# Patient Record
Sex: FEMALE | Race: WHITE | NOT HISPANIC OR LATINO | Employment: FULL TIME | URBAN - METROPOLITAN AREA
[De-identification: names, ages, dates, MRNs, and addresses within clinical notes are randomized per-mention and may not be internally consistent; named-entity substitution may affect disease eponyms.]

---

## 2020-10-19 PROCEDURE — U0003 INFECTIOUS AGENT DETECTION BY NUCLEIC ACID (DNA OR RNA); SEVERE ACUTE RESPIRATORY SYNDROME CORONAVIRUS 2 (SARS-COV-2) (CORONAVIRUS DISEASE [COVID-19]), AMPLIFIED PROBE TECHNIQUE, MAKING USE OF HIGH THROUGHPUT TECHNOLOGIES AS DESCRIBED BY CMS-2020-01-R: HCPCS | Performed by: NURSE PRACTITIONER

## 2020-10-22 ENCOUNTER — TELEPHONE (OUTPATIENT)
Dept: URGENT CARE | Facility: CLINIC | Age: 30
End: 2020-10-22

## 2021-09-07 ENCOUNTER — OFFICE VISIT (OUTPATIENT)
Dept: FAMILY MEDICINE CLINIC | Facility: CLINIC | Age: 31
End: 2021-09-07

## 2021-09-07 ENCOUNTER — LAB (OUTPATIENT)
Dept: FAMILY MEDICINE CLINIC | Facility: CLINIC | Age: 31
End: 2021-09-07

## 2021-09-07 VITALS
BODY MASS INDEX: 44.41 KG/M2 | HEIGHT: 68 IN | WEIGHT: 293 LBS | OXYGEN SATURATION: 100 % | SYSTOLIC BLOOD PRESSURE: 145 MMHG | HEART RATE: 63 BPM | DIASTOLIC BLOOD PRESSURE: 87 MMHG | TEMPERATURE: 98 F

## 2021-09-07 DIAGNOSIS — F41.9 ANXIETY: Primary | ICD-10-CM

## 2021-09-07 DIAGNOSIS — L40.9 PSORIASIS: ICD-10-CM

## 2021-09-07 DIAGNOSIS — R03.0 ELEVATED BP WITHOUT DIAGNOSIS OF HYPERTENSION: ICD-10-CM

## 2021-09-07 DIAGNOSIS — Z00.00 PREVENTATIVE HEALTH CARE: ICD-10-CM

## 2021-09-07 DIAGNOSIS — M25.511 ACUTE PAIN OF RIGHT SHOULDER: ICD-10-CM

## 2021-09-07 PROBLEM — Z80.1 FAMILY HISTORY OF LUNG CANCER: Status: ACTIVE | Noted: 2021-09-07

## 2021-09-07 PROBLEM — Z82.3 FAMILY HISTORY OF CEREBROVASCULAR ACCIDENT (CVA): Status: ACTIVE | Noted: 2021-09-07

## 2021-09-07 LAB
ALBUMIN SERPL-MCNC: 4.3 G/DL (ref 3.5–5.2)
ALBUMIN/GLOB SERPL: 1.4 G/DL
ALP SERPL-CCNC: 72 U/L (ref 39–117)
ALT SERPL W P-5'-P-CCNC: 16 U/L (ref 1–33)
ANION GAP SERPL CALCULATED.3IONS-SCNC: 9.7 MMOL/L (ref 5–15)
AST SERPL-CCNC: 13 U/L (ref 1–32)
BILIRUB SERPL-MCNC: 0.3 MG/DL (ref 0–1.2)
BUN SERPL-MCNC: 8 MG/DL (ref 6–20)
BUN/CREAT SERPL: 9.1 (ref 7–25)
CALCIUM SPEC-SCNC: 9 MG/DL (ref 8.6–10.5)
CHLORIDE SERPL-SCNC: 103 MMOL/L (ref 98–107)
CHOLEST SERPL-MCNC: 163 MG/DL (ref 0–200)
CO2 SERPL-SCNC: 26.3 MMOL/L (ref 22–29)
CREAT SERPL-MCNC: 0.88 MG/DL (ref 0.57–1)
GFR SERPL CREATININE-BSD FRML MDRD: 74 ML/MIN/1.73
GLOBULIN UR ELPH-MCNC: 3 GM/DL
GLUCOSE SERPL-MCNC: 95 MG/DL (ref 65–99)
HCV AB SER DONR QL: NORMAL
HDLC SERPL-MCNC: 54 MG/DL (ref 40–60)
LDLC SERPL CALC-MCNC: 87 MG/DL (ref 0–100)
LDLC/HDLC SERPL: 1.56 {RATIO}
POTASSIUM SERPL-SCNC: 4.3 MMOL/L (ref 3.5–5.2)
PROT SERPL-MCNC: 7.3 G/DL (ref 6–8.5)
SODIUM SERPL-SCNC: 139 MMOL/L (ref 136–145)
TRIGL SERPL-MCNC: 125 MG/DL (ref 0–150)
TSH SERPL DL<=0.05 MIU/L-ACNC: 1.92 UIU/ML (ref 0.27–4.2)
VLDLC SERPL-MCNC: 22 MG/DL (ref 5–40)

## 2021-09-07 PROCEDURE — 84443 ASSAY THYROID STIM HORMONE: CPT | Performed by: NURSE PRACTITIONER

## 2021-09-07 PROCEDURE — 80053 COMPREHEN METABOLIC PANEL: CPT | Performed by: NURSE PRACTITIONER

## 2021-09-07 PROCEDURE — 86803 HEPATITIS C AB TEST: CPT | Performed by: NURSE PRACTITIONER

## 2021-09-07 PROCEDURE — 99204 OFFICE O/P NEW MOD 45 MIN: CPT | Performed by: NURSE PRACTITIONER

## 2021-09-07 PROCEDURE — 36415 COLL VENOUS BLD VENIPUNCTURE: CPT

## 2021-09-07 PROCEDURE — 80061 LIPID PANEL: CPT | Performed by: NURSE PRACTITIONER

## 2021-09-07 RX ORDER — ESCITALOPRAM OXALATE 10 MG/1
10 TABLET ORAL DAILY
Qty: 30 TABLET | Refills: 0 | Status: SHIPPED | OUTPATIENT
Start: 2021-09-07 | End: 2021-10-06

## 2021-09-07 RX ORDER — TRIAMCINOLONE ACETONIDE 1 MG/G
CREAM TOPICAL
Qty: 80 G | Refills: 3 | Status: SHIPPED | OUTPATIENT
Start: 2021-09-07 | End: 2021-12-11 | Stop reason: SDUPTHER

## 2021-09-07 NOTE — PROGRESS NOTES
Subjective     Alicia Ferrer is a 31 y.o. female.     Alicia Ferrer is here today to establish care.  She is from this area  Previous PCP was Khloe Abreu  Marital status-   Children- Yes- 2 years old son  Works as  at the [employer]  Exercise- was working out 2 days a week but none recently  Diet- Eating well-balanced meals and healthy snacks with no concerns  Pt had covid 19 in Oct 2020.  She gained 50lbs when she was pregnant and never lost the weight.  She has been discouraged with weight loss.    Anxiety/depression- she states she gets upset and anxious in certain situations. She has certain triggers- chewing gum, hearing eating. She states she struggles more with the anxiety than the depression. She struggles with body image issues. She has never been on meds in the past. Denies any SI or HI.    Psoriasis- has had long term. She used to see dermatology, but hasnt in quite some time.  It is primarily on her scalp. Has used triamcinolone cream which helps. She now has a rash on her abdomen.     Right shoulder pain- slipped in the shower and caught herself with her right arm in July. She states that she continues to have some pain in the right shoulder. Pain comes and goes with certain movements. When she is driving she can feel a pull in the shoulder. She took tylenol and ibuprofen in the beginning. Rates the pain 6/10.    Labs- due  Pap smear- 12/14/18- normal- will start getting here    Vaccines:  Flu- N/A  PNA- N/A  Tdap- 2019  Covid-19- UTD    Dental exam- UTD  Eye exam- UTD           The following portions of the patient's history were reviewed and updated as appropriate: allergies, current medications, past family history, past medical history, past social history, past surgical history and problem list.    Review of Systems   Constitutional: Negative for appetite change, chills, fatigue and fever.   HENT: Negative for congestion, ear pain, hearing loss, postnasal drip,  "rhinorrhea, sinus pressure, sore throat, swollen glands and trouble swallowing.    Eyes: Negative for blurred vision, double vision, pain, discharge, itching and visual disturbance.   Respiratory: Negative for cough, chest tightness, shortness of breath and wheezing.    Cardiovascular: Negative for chest pain and palpitations.   Gastrointestinal: Negative for abdominal pain, blood in stool, constipation, diarrhea, nausea and vomiting.   Endocrine: Negative for polydipsia, polyphagia and polyuria.   Genitourinary: Negative for dysuria, flank pain, frequency and urgency.   Musculoskeletal: Positive for arthralgias (right shoulder). Negative for back pain and myalgias.   Skin: Positive for rash (psoriasis on scalp, abdomen, and right elbow). Negative for skin lesions.   Neurological: Positive for headache. Negative for dizziness, weakness and numbness.   Psychiatric/Behavioral: Negative for self-injury, suicidal ideas, depressed mood and stress. The patient is nervous/anxious.        Objective     /87   Pulse 63   Temp 98 °F (36.7 °C) (Tympanic)   Ht 171.5 cm (67.5\")   Wt 136 kg (300 lb)   SpO2 100%   BMI 46.29 kg/m²     Current Outpatient Medications on File Prior to Visit   Medication Sig Dispense Refill   • [DISCONTINUED] triamcinolone (KENALOG) 0.1 % cream Apply to the affected area(s) three times daily 80 g 3     No current facility-administered medications on file prior to visit.        Physical Exam  Vitals reviewed.   Constitutional:       General: She is not in acute distress.     Appearance: Normal appearance. She is well-developed. She is not diaphoretic.   HENT:      Head: Normocephalic and atraumatic.   Eyes:      General:         Right eye: No discharge.         Left eye: No discharge.      Conjunctiva/sclera: Conjunctivae normal.      Pupils: Pupils are equal, round, and reactive to light.   Cardiovascular:      Rate and Rhythm: Normal rate and regular rhythm.      Heart sounds: No murmur " heard.     Pulmonary:      Effort: Pulmonary effort is normal. No respiratory distress.      Breath sounds: Normal breath sounds. No wheezing or rales.   Abdominal:      General: Bowel sounds are normal. There is no distension.      Palpations: Abdomen is soft.      Tenderness: There is no abdominal tenderness.   Musculoskeletal:         General: Normal range of motion.      Cervical back: Normal range of motion and neck supple.      Comments: Normal ROM right shoulder   Skin:     General: Skin is warm and dry.   Neurological:      Mental Status: She is alert and oriented to person, place, and time.   Psychiatric:         Mood and Affect: Mood normal.         Behavior: Behavior normal.         Thought Content: Thought content normal.         Judgment: Judgment normal.           Assessment/Plan     Diagnoses and all orders for this visit:    1. Anxiety (Primary)  Comments:  new issue  start lexapro  f/u 1 mo  denies SI or HI  Orders:  -     escitalopram (Lexapro) 10 MG tablet; Take 1 tablet by mouth Daily.  Dispense: 30 tablet; Refill: 0    2. Preventative health care  Comments:  work on diet and exercise  check labs  schedule pap in Dec  Orders:  -     Lipid panel; Future  -     Comprehensive metabolic panel; Future  -     Hepatitis C Antibody; Future    3. Body mass index (BMI) of 45.0 to 49.9 in adult (CMS/HCC)  Comments:  work on diet and exercise  exercise 4-5 days a week  Orders:  -     TSH; Future    4. Psoriasis  Comments:  stable  doesnt want to see derm yet  cont kenalog  Orders:  -     triamcinolone (KENALOG) 0.1 % cream; Apply to the affected area(s) three times daily  Dispense: 80 g; Refill: 3    5. Acute pain of right shoulder  Comments:  possibly rotator cuff strain  full ROM  try ibuprofen  PT ordered  Orders:  -     Ambulatory Referral to Physical Therapy Evaluate and treat    6. Elevated BP without diagnosis of hypertension  Comments:  work on diet and exercise  may need to start BP med  f/u 1  mo

## 2021-09-07 NOTE — PATIENT INSTRUCTIONS
Start lexapro  Work on diet and exercise  Check labs  Take ibuprofen for shoulder pain  PT ordered  Call for issues or concerns

## 2021-10-06 ENCOUNTER — OFFICE VISIT (OUTPATIENT)
Dept: FAMILY MEDICINE CLINIC | Facility: CLINIC | Age: 31
End: 2021-10-06

## 2021-10-06 VITALS
OXYGEN SATURATION: 98 % | DIASTOLIC BLOOD PRESSURE: 87 MMHG | HEART RATE: 61 BPM | WEIGHT: 293 LBS | SYSTOLIC BLOOD PRESSURE: 140 MMHG | BODY MASS INDEX: 46.29 KG/M2 | TEMPERATURE: 98 F

## 2021-10-06 DIAGNOSIS — F41.9 ANXIETY: Primary | ICD-10-CM

## 2021-10-06 PROCEDURE — 99213 OFFICE O/P EST LOW 20 MIN: CPT | Performed by: NURSE PRACTITIONER

## 2021-10-06 RX ORDER — ESCITALOPRAM OXALATE 20 MG/1
20 TABLET ORAL DAILY
Qty: 30 TABLET | Refills: 2 | Status: SHIPPED | OUTPATIENT
Start: 2021-10-06 | End: 2022-01-03 | Stop reason: SDUPTHER

## 2021-10-06 NOTE — PATIENT INSTRUCTIONS
Increase lexapro to 20mg daily  \message update in 1 mo via [employer]  Call for issues or concerns

## 2021-10-06 NOTE — PROGRESS NOTES
Subjective     Alicia Ferrer is a 31 y.o. female.     PT is here today for a 1 mo follow up on anxiety.  Last month she was started on lexapro 10mg daily.  She states that she can tell a difference since starting the medication but she is still feeling some anxiety and pulling at her chest.  She states that her coworkers have noticed an improvement  Doesn't have issues with depression.  Denies SI or HI   Would like to try increasing the medication.        The following portions of the patient's history were reviewed and updated as appropriate: allergies, current medications, past family history, past medical history, past social history, past surgical history and problem list.    Review of Systems   Constitutional: Negative for chills, fatigue and fever.   Respiratory: Positive for chest tightness. Negative for shortness of breath.    Cardiovascular: Negative for chest pain and palpitations.   Neurological: Negative for dizziness and headache.   Psychiatric/Behavioral: Negative for depressed mood and stress. The patient is nervous/anxious.        Objective     /87 (BP Location: Right arm, Patient Position: Sitting, Cuff Size: Large Adult)   Pulse 61   Temp 98 °F (36.7 °C) (Tympanic)   Wt 136 kg (300 lb)   SpO2 98%   BMI 46.29 kg/m²     Current Outpatient Medications on File Prior to Visit   Medication Sig Dispense Refill   • triamcinolone (KENALOG) 0.1 % cream Apply to the affected area(s) three times daily 80 g 3   • [DISCONTINUED] escitalopram (Lexapro) 10 MG tablet Take 1 tablet by mouth Daily. 30 tablet 0     No current facility-administered medications on file prior to visit.        Physical Exam  Constitutional:       Appearance: Normal appearance. She is not ill-appearing.   HENT:      Head: Normocephalic.   Pulmonary:      Effort: Pulmonary effort is normal. No respiratory distress.      Breath sounds: Normal breath sounds.   Musculoskeletal:         General: Normal range of motion.   Skin:      General: Skin is warm and dry.   Neurological:      General: No focal deficit present.      Mental Status: She is alert and oriented to person, place, and time.   Psychiatric:         Mood and Affect: Mood normal.         Behavior: Behavior normal.         Thought Content: Thought content normal.         Judgment: Judgment normal.           Assessment/Plan     Diagnoses and all orders for this visit:    1. Anxiety (Primary)  Comments:  improved  will try to increase lexapro to 20mg daily  call with update in1 mo  f/u 3 mo  Orders:  -     escitalopram (Lexapro) 20 MG tablet; Take 1 tablet by mouth Daily.  Dispense: 30 tablet; Refill: 2

## 2021-12-11 DIAGNOSIS — L40.9 PSORIASIS: ICD-10-CM

## 2021-12-13 RX ORDER — TRIAMCINOLONE ACETONIDE 1 MG/G
CREAM TOPICAL
Qty: 80 G | Refills: 3 | Status: SHIPPED | OUTPATIENT
Start: 2021-12-13 | End: 2022-01-13 | Stop reason: SDUPTHER

## 2022-01-03 DIAGNOSIS — F41.9 ANXIETY: ICD-10-CM

## 2022-01-03 RX ORDER — ESCITALOPRAM OXALATE 20 MG/1
20 TABLET ORAL DAILY
Qty: 30 TABLET | Refills: 2 | Status: SHIPPED | OUTPATIENT
Start: 2022-01-03 | End: 2022-02-09 | Stop reason: SDUPTHER

## 2022-01-13 DIAGNOSIS — L40.9 PSORIASIS: ICD-10-CM

## 2022-01-13 RX ORDER — TRIAMCINOLONE ACETONIDE 1 MG/G
CREAM TOPICAL
Qty: 80 G | Refills: 3 | Status: SHIPPED | OUTPATIENT
Start: 2022-01-13 | End: 2022-02-13 | Stop reason: SDUPTHER

## 2022-02-09 ENCOUNTER — OFFICE VISIT (OUTPATIENT)
Dept: FAMILY MEDICINE CLINIC | Facility: CLINIC | Age: 32
End: 2022-02-09

## 2022-02-09 VITALS
TEMPERATURE: 97.7 F | SYSTOLIC BLOOD PRESSURE: 133 MMHG | OXYGEN SATURATION: 97 % | DIASTOLIC BLOOD PRESSURE: 83 MMHG | BODY MASS INDEX: 45.52 KG/M2 | HEART RATE: 78 BPM | WEIGHT: 293 LBS

## 2022-02-09 DIAGNOSIS — Z12.4 PAP SMEAR FOR CERVICAL CANCER SCREENING: ICD-10-CM

## 2022-02-09 DIAGNOSIS — F41.9 ANXIETY: Primary | ICD-10-CM

## 2022-02-09 PROCEDURE — 99213 OFFICE O/P EST LOW 20 MIN: CPT | Performed by: NURSE PRACTITIONER

## 2022-02-09 RX ORDER — ESCITALOPRAM OXALATE 20 MG/1
20 TABLET ORAL DAILY
Qty: 90 TABLET | Refills: 1 | Status: SHIPPED | OUTPATIENT
Start: 2022-02-09 | End: 2022-10-11 | Stop reason: SDUPTHER

## 2022-02-09 NOTE — PROGRESS NOTES
Subjective     Alicia Ferrer is a 31 y.o. female.     Patient is here today to follow-up on anxiety and to have her Pap smear completed.  Patient's Lexapro was increased to 20 mg at her last visit.  Patient reports that the increased dose in working much better.  She states that she did notice some increased fatigue and a headache when she first started the increased dose but that has resolved.  She states that there are still times that she feels tightening in her chest d/t anxiety but that it is occurring a lot less frequent.   Pt reports that she is satisfied with the dosage at this time.    Pt reports normal menstrual cycles. They typically last 4-6 days. Her last cycle was She does report some dysmenorrhea. Her last cycle was 2/1/22. She denies any vaginal pain, abnormal bleeding, or discharge.        The following portions of the patient's history were reviewed and updated as appropriate: allergies, current medications, past family history, past medical history, past social history, past surgical history and problem list.    Review of Systems   Constitutional: Negative for chills, fatigue and fever.   Respiratory: Positive for chest tightness (on occasion with anxiety). Negative for shortness of breath.    Cardiovascular: Negative for chest pain and palpitations.   Genitourinary: Negative for difficulty urinating, dysuria, menstrual problem, pelvic pain, vaginal bleeding, vaginal discharge and vaginal pain.   Psychiatric/Behavioral: Negative for self-injury and suicidal ideas. The patient is not nervous/anxious.        Objective     /83   Pulse 78   Temp 97.7 °F (36.5 °C) (Tympanic)   Wt 134 kg (295 lb)   SpO2 97%   BMI 45.52 kg/m²     Current Outpatient Medications on File Prior to Visit   Medication Sig Dispense Refill   • escitalopram (Lexapro) 20 MG tablet Take 1 tablet by mouth Daily. 30 tablet 2   • triamcinolone (KENALOG) 0.1 % cream Apply to the affected area(s) three times daily as  directed. 80 g 3     No current facility-administered medications on file prior to visit.        Physical Exam  Exam conducted with a chaperone present.   Constitutional:       General: She is not in acute distress.     Appearance: She is obese. She is not ill-appearing.   HENT:      Head: Normocephalic and atraumatic.   Cardiovascular:      Rate and Rhythm: Normal rate and regular rhythm.      Pulses: Normal pulses.      Heart sounds: No murmur heard.      Pulmonary:      Effort: Pulmonary effort is normal. No respiratory distress.      Breath sounds: Normal breath sounds. No wheezing.   Genitourinary:     Exam position: Supine.      Labia:         Right: No rash or tenderness.         Left: No rash or tenderness.       Vagina: Normal.      Cervix: Normal.      Uterus: Not enlarged and not tender.       Adnexa:         Right: No mass or tenderness.          Left: No mass or tenderness.     Musculoskeletal:         General: Normal range of motion.   Skin:     General: Skin is warm and dry.   Neurological:      General: No focal deficit present.      Mental Status: She is alert and oriented to person, place, and time.   Psychiatric:         Mood and Affect: Mood normal.         Behavior: Behavior normal.         Thought Content: Thought content normal.         Judgment: Judgment normal.           Assessment/Plan     Diagnoses and all orders for this visit:    1. Anxiety (Primary)  Comments:  Improving  Cont 20mg Lexapro  call for issues/concerns  f/u in 6mos    2. Pap smear for cervical cancer screening  Comments:  pap and pelvic completed  will notify of results

## 2022-02-12 LAB
AGE GDLN ACOG TESTING: NORMAL
CYTOLOGIST CVX/VAG CYTO: NORMAL
CYTOLOGY CVX/VAG DOC CYTO: NORMAL
CYTOLOGY CVX/VAG DOC THIN PREP: NORMAL
DX ICD CODE: NORMAL
HIV 1 & 2 AB SER-IMP: NORMAL
HPV I/H RISK 4 DNA CVX QL PROBE+SIG AMP: NEGATIVE
OTHER STN SPEC: NORMAL
STAT OF ADQ CVX/VAG CYTO-IMP: NORMAL

## 2022-02-13 DIAGNOSIS — L40.9 PSORIASIS: ICD-10-CM

## 2022-02-13 RX ORDER — TRIAMCINOLONE ACETONIDE 1 MG/G
CREAM TOPICAL
Qty: 80 G | Refills: 3 | Status: SHIPPED | OUTPATIENT
Start: 2022-02-13 | End: 2022-06-06 | Stop reason: SDUPTHER

## 2022-02-13 RX ORDER — TRIAMCINOLONE ACETONIDE 1 MG/G
CREAM TOPICAL
Qty: 80 G | Refills: 3 | Status: CANCELLED | OUTPATIENT
Start: 2022-02-13

## 2022-05-31 ENCOUNTER — TELEPHONE (OUTPATIENT)
Dept: FAMILY MEDICINE CLINIC | Facility: CLINIC | Age: 32
End: 2022-05-31

## 2022-05-31 RX ORDER — AZITHROMYCIN 250 MG/1
TABLET, FILM COATED ORAL
Qty: 6 TABLET | Refills: 0 | Status: SHIPPED | OUTPATIENT
Start: 2022-05-31 | End: 2022-08-10

## 2022-05-31 NOTE — TELEPHONE ENCOUNTER
----- Message from Carlene Dodson CMA sent at 2022 11:34 AM EDT -----  Regarding: FW: Strep throat     ----- Message -----  From: Alicia Ferrer  Sent: 2022  11:31 AM EDT  To: [facility]  Subject: Strep throat                                     If not then I can just try and go to the urgent care after my nephew's  this afternoon. Please just let me know what I should do because my throat is on fire

## 2022-06-06 DIAGNOSIS — L40.9 PSORIASIS: ICD-10-CM

## 2022-06-06 RX ORDER — TRIAMCINOLONE ACETONIDE 1 MG/G
CREAM TOPICAL
Qty: 80 G | Refills: 3 | Status: SHIPPED | OUTPATIENT
Start: 2022-06-06 | End: 2022-07-30 | Stop reason: SDUPTHER

## 2022-07-19 RX ORDER — FLUCONAZOLE 150 MG/1
150 TABLET ORAL ONCE
Qty: 1 TABLET | Refills: 0 | Status: SHIPPED | OUTPATIENT
Start: 2022-07-19 | End: 2022-07-20

## 2022-07-30 DIAGNOSIS — L40.9 PSORIASIS: ICD-10-CM

## 2022-07-30 RX ORDER — TRIAMCINOLONE ACETONIDE 1 MG/G
CREAM TOPICAL
Qty: 80 G | Refills: 3 | Status: SHIPPED | OUTPATIENT
Start: 2022-07-30 | End: 2022-09-23 | Stop reason: SDUPTHER

## 2022-08-10 ENCOUNTER — OFFICE VISIT (OUTPATIENT)
Dept: FAMILY MEDICINE CLINIC | Facility: CLINIC | Age: 32
End: 2022-08-10

## 2022-08-10 VITALS
HEART RATE: 63 BPM | DIASTOLIC BLOOD PRESSURE: 84 MMHG | OXYGEN SATURATION: 98 % | SYSTOLIC BLOOD PRESSURE: 142 MMHG | WEIGHT: 293 LBS | BODY MASS INDEX: 45.83 KG/M2 | TEMPERATURE: 98.2 F

## 2022-08-10 DIAGNOSIS — F41.9 ANXIETY: Primary | ICD-10-CM

## 2022-08-10 DIAGNOSIS — Z00.00 PREVENTATIVE HEALTH CARE: ICD-10-CM

## 2022-08-10 DIAGNOSIS — L40.9 PSORIASIS: ICD-10-CM

## 2022-08-10 PROCEDURE — 99213 OFFICE O/P EST LOW 20 MIN: CPT | Performed by: NURSE PRACTITIONER

## 2022-08-10 NOTE — PROGRESS NOTES
Answers for HPI/ROS submitted by the patient on 8/3/2022  Please describe your symptoms.: 6 mo f/u  Have you had these symptoms before?: Yes  How long have you been having these symptoms?: 1-4 days  Please list any medications you are currently taking for this condition.: Lexapro 20 mg, Cream for psoriasis  What is the primary reason for your visit?: Other    Subjective     Alicia Ferrer is a 32 y.o. female.     Patient is here today for 6-month follow-up on anxiety and psoriasis.  BP continues to be mildly elevated.  She will monitor it at work.   Denies any issues or concerns today     Anxiety-patient is currently on Lexapro 20 mg daily. Doing well on medication. Denies any SI or HI. No side effects.    Psoriasis-patient currently has Kenalog 0.1% cream as needed.  She does not get flares often.  When she does they are mostly on her scalp. She will get one on her elbow as well.     Labs- due  Pap smear- UTD 2/9/22     Vaccines:  Flu- N/A  PNA- N/A  Tdap- 2019  Covid-19- UTD     Dental exam- UTD  Eye exam- UTD       The following portions of the patient's history were reviewed and updated as appropriate: allergies, current medications, past family history, past medical history, past social history, past surgical history and problem list.    Review of Systems   Constitutional: Negative for chills, fatigue and fever.   Respiratory: Negative for chest tightness and shortness of breath.    Cardiovascular: Negative for chest pain and palpitations.   Skin: Positive for skin lesions (psoriasis).   Neurological: Negative for dizziness and headache.   Psychiatric/Behavioral: Negative for self-injury, suicidal ideas, depressed mood and stress. The patient is not nervous/anxious.        Objective     /84 (BP Location: Left arm, Patient Position: Sitting, Cuff Size: Large Adult)   Pulse 63   Temp 98.2 °F (36.8 °C) (Tympanic)   Wt 135 kg (297 lb)   SpO2 98%   BMI 45.83 kg/m²     Current Outpatient Medications on  File Prior to Visit   Medication Sig Dispense Refill   • escitalopram (Lexapro) 20 MG tablet Take 1 tablet by mouth Daily. 90 tablet 1   • triamcinolone (KENALOG) 0.1 % cream Apply topically to the affected area(s) three times daily as directed. 80 g 3   • [DISCONTINUED] azithromycin (Zithromax) 250 MG tablet Take 2 tablets the first day, then 1 tablet daily for 4 days. 6 tablet 0     No current facility-administered medications on file prior to visit.        Physical Exam  Constitutional:       General: She is not in acute distress.     Appearance: Normal appearance. She is obese. She is not ill-appearing.   HENT:      Head: Normocephalic and atraumatic.   Cardiovascular:      Rate and Rhythm: Normal rate and regular rhythm.      Heart sounds: No murmur heard.  Pulmonary:      Effort: Pulmonary effort is normal. No respiratory distress.      Breath sounds: Normal breath sounds.   Musculoskeletal:         General: Normal range of motion.   Skin:     General: Skin is warm and dry.      Findings: Lesion (psoriasis dmitry arm) present.   Neurological:      General: No focal deficit present.      Mental Status: She is alert and oriented to person, place, and time.   Psychiatric:         Mood and Affect: Mood normal.         Behavior: Behavior normal.         Thought Content: Thought content normal.         Judgment: Judgment normal.           Assessment & Plan     Diagnoses and all orders for this visit:    1. Anxiety (Primary)  Comments:  stable  cont lexapro  denies SI or HI  f/u 1 yr    2. Psoriasis  Comments:  stable  cont steroid cream    3. Preventative health care  Comments:  check labs  work on diet and exercise  Orders:  -     Comprehensive Metabolic Panel; Future  -     Lipid Panel; Future

## 2022-09-14 ENCOUNTER — LAB (OUTPATIENT)
Dept: LAB | Facility: HOSPITAL | Age: 32
End: 2022-09-14

## 2022-09-14 DIAGNOSIS — Z00.00 PREVENTATIVE HEALTH CARE: ICD-10-CM

## 2022-09-14 LAB
ALBUMIN SERPL-MCNC: 4.4 G/DL (ref 3.5–5.2)
ALBUMIN/GLOB SERPL: 1.6 G/DL
ALP SERPL-CCNC: 61 U/L (ref 39–117)
ALT SERPL W P-5'-P-CCNC: 21 U/L (ref 1–33)
ANION GAP SERPL CALCULATED.3IONS-SCNC: 9.7 MMOL/L (ref 5–15)
AST SERPL-CCNC: 19 U/L (ref 1–32)
BILIRUB SERPL-MCNC: 0.5 MG/DL (ref 0–1.2)
BUN SERPL-MCNC: 10 MG/DL (ref 6–20)
BUN/CREAT SERPL: 12.5 (ref 7–25)
CALCIUM SPEC-SCNC: 9.7 MG/DL (ref 8.6–10.5)
CHLORIDE SERPL-SCNC: 103 MMOL/L (ref 98–107)
CHOLEST SERPL-MCNC: 179 MG/DL (ref 0–200)
CO2 SERPL-SCNC: 25.3 MMOL/L (ref 22–29)
CREAT SERPL-MCNC: 0.8 MG/DL (ref 0.57–1)
EGFRCR SERPLBLD CKD-EPI 2021: 99.3 ML/MIN/1.73
GLOBULIN UR ELPH-MCNC: 2.7 GM/DL
GLUCOSE SERPL-MCNC: 89 MG/DL (ref 65–99)
HDLC SERPL-MCNC: 62 MG/DL (ref 40–60)
LDLC SERPL CALC-MCNC: 91 MG/DL (ref 0–100)
LDLC/HDLC SERPL: 1.41 {RATIO}
POTASSIUM SERPL-SCNC: 4.2 MMOL/L (ref 3.5–5.2)
PROT SERPL-MCNC: 7.1 G/DL (ref 6–8.5)
SODIUM SERPL-SCNC: 138 MMOL/L (ref 136–145)
TRIGL SERPL-MCNC: 149 MG/DL (ref 0–150)
VLDLC SERPL-MCNC: 26 MG/DL (ref 5–40)

## 2022-09-14 PROCEDURE — 80053 COMPREHEN METABOLIC PANEL: CPT

## 2022-09-14 PROCEDURE — 80061 LIPID PANEL: CPT

## 2022-09-14 PROCEDURE — 36415 COLL VENOUS BLD VENIPUNCTURE: CPT

## 2022-09-16 RX ORDER — BUSPIRONE HYDROCHLORIDE 5 MG/1
5 TABLET ORAL 2 TIMES DAILY PRN
Qty: 60 TABLET | Refills: 0 | Status: SHIPPED | OUTPATIENT
Start: 2022-09-16 | End: 2022-10-11 | Stop reason: SDUPTHER

## 2022-09-23 DIAGNOSIS — L40.9 PSORIASIS: ICD-10-CM

## 2022-09-23 RX ORDER — TRIAMCINOLONE ACETONIDE 1 MG/G
CREAM TOPICAL
Qty: 80 G | Refills: 3 | Status: SHIPPED | OUTPATIENT
Start: 2022-09-23 | End: 2023-01-16 | Stop reason: SDUPTHER

## 2022-10-11 DIAGNOSIS — F41.9 ANXIETY: ICD-10-CM

## 2022-10-11 RX ORDER — BUSPIRONE HYDROCHLORIDE 5 MG/1
5 TABLET ORAL 2 TIMES DAILY PRN
Qty: 60 TABLET | Refills: 0 | Status: SHIPPED | OUTPATIENT
Start: 2022-10-11 | End: 2022-12-16 | Stop reason: SDUPTHER

## 2022-10-11 RX ORDER — ESCITALOPRAM OXALATE 20 MG/1
20 TABLET ORAL DAILY
Qty: 90 TABLET | Refills: 1 | Status: SHIPPED | OUTPATIENT
Start: 2022-10-11 | End: 2023-03-01 | Stop reason: SDUPTHER

## 2022-12-16 RX ORDER — BUSPIRONE HYDROCHLORIDE 5 MG/1
5 TABLET ORAL 2 TIMES DAILY PRN
Qty: 60 TABLET | Refills: 0 | Status: SHIPPED | OUTPATIENT
Start: 2022-12-16 | End: 2023-03-01 | Stop reason: SDUPTHER

## 2023-01-16 DIAGNOSIS — L40.9 PSORIASIS: ICD-10-CM

## 2023-01-16 RX ORDER — TRIAMCINOLONE ACETONIDE 1 MG/G
CREAM TOPICAL
Qty: 80 G | Refills: 3 | Status: SHIPPED | OUTPATIENT
Start: 2023-01-16

## 2023-03-01 DIAGNOSIS — F41.9 ANXIETY: ICD-10-CM

## 2023-03-01 RX ORDER — ESCITALOPRAM OXALATE 20 MG/1
20 TABLET ORAL DAILY
Qty: 90 TABLET | Refills: 1 | Status: SHIPPED | OUTPATIENT
Start: 2023-03-01

## 2023-03-01 RX ORDER — BUSPIRONE HYDROCHLORIDE 5 MG/1
5 TABLET ORAL 2 TIMES DAILY PRN
Qty: 60 TABLET | Refills: 0 | Status: SHIPPED | OUTPATIENT
Start: 2023-03-01

## 2023-05-05 DIAGNOSIS — L40.9 PSORIASIS: ICD-10-CM

## 2023-05-05 RX ORDER — TRIAMCINOLONE ACETONIDE 1 MG/G
CREAM TOPICAL
Qty: 80 G | Refills: 3 | Status: SHIPPED | OUTPATIENT
Start: 2023-05-05

## 2023-05-21 RX ORDER — BUSPIRONE HYDROCHLORIDE 5 MG/1
5 TABLET ORAL 2 TIMES DAILY PRN
Qty: 60 TABLET | Refills: 0 | Status: CANCELLED | OUTPATIENT
Start: 2023-05-21

## 2023-05-22 RX ORDER — BUSPIRONE HYDROCHLORIDE 5 MG/1
5 TABLET ORAL 2 TIMES DAILY PRN
Qty: 60 TABLET | Refills: 0 | Status: SHIPPED | OUTPATIENT
Start: 2023-05-22

## 2023-08-01 RX ORDER — BUSPIRONE HYDROCHLORIDE 5 MG/1
5 TABLET ORAL 2 TIMES DAILY PRN
Qty: 60 TABLET | Refills: 0 | Status: SHIPPED | OUTPATIENT
Start: 2023-08-01

## 2023-08-16 ENCOUNTER — OFFICE VISIT (OUTPATIENT)
Dept: FAMILY MEDICINE CLINIC | Facility: CLINIC | Age: 33
End: 2023-08-16
Payer: COMMERCIAL

## 2023-08-16 VITALS
HEART RATE: 70 BPM | TEMPERATURE: 98 F | SYSTOLIC BLOOD PRESSURE: 139 MMHG | BODY MASS INDEX: 44.41 KG/M2 | OXYGEN SATURATION: 97 % | DIASTOLIC BLOOD PRESSURE: 86 MMHG | HEIGHT: 68 IN | WEIGHT: 293 LBS

## 2023-08-16 DIAGNOSIS — F41.9 ANXIETY: ICD-10-CM

## 2023-08-16 DIAGNOSIS — Z00.00 PREVENTATIVE HEALTH CARE: Primary | ICD-10-CM

## 2023-08-16 DIAGNOSIS — L40.9 PSORIASIS: ICD-10-CM

## 2023-08-16 PROCEDURE — 99395 PREV VISIT EST AGE 18-39: CPT | Performed by: NURSE PRACTITIONER

## 2023-08-16 NOTE — PROGRESS NOTES
Subjective     Alicia Ferrer is a 33 y.o. female.     History of Present Illness  Patient is here today for her annual CPE  BP continues to be borderline  Pt is a  at the [employer].  She has not been exercising.  She tries to eat a well balanced diet.  She is having regular menstrual cycles.  She is not on birth control.  Denies issues or concerns     Anxiety-patient is currently on Lexapro 20 mg daily and buspar 5mg daily. Doing well on medication. Denies any SI or HI. No side effects.     Psoriasis-patient currently has Kenalog 0.1% cream as needed.  She does not get flares often.  When she does they are mostly on her scalp. She will get one on her elbow as well.      Labs- due  Pap smear- UTD 2/9/22     Vaccines:  Flu- N/A  PNA- N/A  Tdap- 2019  Covid-19- UTD     Dental exam- UTD  Eye exam- UTD       The following portions of the patient's history were reviewed and updated as appropriate: allergies, current medications, past family history, past medical history, past social history, past surgical history, and problem list.    Review of Systems   Constitutional:  Negative for appetite change, chills, fatigue and fever.   HENT:  Negative for congestion, ear pain, hearing loss, postnasal drip, rhinorrhea, sinus pressure, sore throat, swollen glands and trouble swallowing.    Eyes:  Negative for blurred vision, double vision, pain, discharge, itching and visual disturbance.   Respiratory:  Negative for cough, chest tightness, shortness of breath and wheezing.    Cardiovascular:  Negative for chest pain and palpitations.   Gastrointestinal:  Positive for diarrhea (occasional). Negative for abdominal pain, blood in stool, constipation, nausea and vomiting.   Endocrine: Negative for polydipsia, polyphagia and polyuria.   Genitourinary:  Negative for breast lump, breast pain, dysuria, flank pain, frequency, urgency, vaginal discharge and vaginal pain.   Musculoskeletal:  Negative for arthralgias, back  "pain and myalgias.   Skin:  Positive for rash. Negative for skin lesions. Wound: psoriasis.  Neurological:  Negative for dizziness, weakness, numbness and headache.   Psychiatric/Behavioral:  Negative for self-injury, suicidal ideas, depressed mood and stress. The patient is not nervous/anxious.      Objective     /86 (BP Location: Left arm, Patient Position: Sitting, Cuff Size: Large Adult)   Pulse 70   Temp 98 øF (36.7 øC) (Oral)   Ht 171.5 cm (67.5\")   Wt (!) 142 kg (312 lb)   SpO2 97%   BMI 48.14 kg/mý     Current Outpatient Medications on File Prior to Visit   Medication Sig Dispense Refill    busPIRone (BUSPAR) 5 MG tablet Take 1 tablet by mouth 2 (Two) Times a Day As Needed for anxiety 60 tablet 0    escitalopram (Lexapro) 20 MG tablet Take 1 tablet by mouth Daily. 90 tablet 1    triamcinolone (KENALOG) 0.1 % cream Apply topically to the affected area(s) three times daily as directed. 80 g 3     No current facility-administered medications on file prior to visit.        Class 3 Severe Obesity (BMI >=40). Obesity-related health conditions include the following: none. Obesity is unchanged. BMI is is above average; BMI management plan is completed. We discussed portion control and increasing exercise.       Physical Exam  Vitals reviewed.   Constitutional:       General: She is not in acute distress.     Appearance: Normal appearance. She is well-developed. She is obese. She is not diaphoretic.   HENT:      Head: Normocephalic and atraumatic.      Right Ear: Tympanic membrane and ear canal normal.      Left Ear: Tympanic membrane and ear canal normal.      Nose: No congestion.   Eyes:      General:         Right eye: No discharge.         Left eye: No discharge.      Extraocular Movements: Extraocular movements intact.      Conjunctiva/sclera: Conjunctivae normal.   Cardiovascular:      Rate and Rhythm: Normal rate and regular rhythm.      Heart sounds: No murmur heard.  Pulmonary:      Effort: " Pulmonary effort is normal. No respiratory distress.      Breath sounds: Normal breath sounds. No wheezing or rales.   Abdominal:      General: Bowel sounds are normal.      Palpations: Abdomen is soft.   Musculoskeletal:         General: Normal range of motion.      Cervical back: Normal range of motion.   Skin:     General: Skin is warm and dry.      Comments: Face and scalp peeling from sunburn   Neurological:      General: No focal deficit present.      Mental Status: She is alert and oriented to person, place, and time.   Psychiatric:         Mood and Affect: Mood normal.         Behavior: Behavior normal.         Thought Content: Thought content normal.         Judgment: Judgment normal.         Assessment & Plan     Diagnoses and all orders for this visit:    1. Preventative health care (Primary)  Comments:  doing well  work on diet and exercise- encouraged 150mins/wk  check labs  pap UTD  Orders:  -     Comprehensive Metabolic Panel; Future  -     Lipid Panel; Future    2. Anxiety  Comments:  stable  cont lexapro and buspar  denies SI or HI    3. Psoriasis  Comments:  stable  cont steroid cream

## 2023-08-27 DIAGNOSIS — L40.9 PSORIASIS: ICD-10-CM

## 2023-08-27 RX ORDER — TRIAMCINOLONE ACETONIDE 1 MG/G
CREAM TOPICAL
Qty: 80 G | Refills: 3 | Status: SHIPPED | OUTPATIENT
Start: 2023-08-27

## 2023-10-10 RX ORDER — BUSPIRONE HYDROCHLORIDE 5 MG/1
5 TABLET ORAL 2 TIMES DAILY PRN
Qty: 60 TABLET | Refills: 0 | Status: SHIPPED | OUTPATIENT
Start: 2023-10-10

## 2023-10-23 DIAGNOSIS — F41.9 ANXIETY: ICD-10-CM

## 2023-10-23 RX ORDER — ESCITALOPRAM OXALATE 20 MG/1
20 TABLET ORAL DAILY
Qty: 90 TABLET | Refills: 1 | Status: SHIPPED | OUTPATIENT
Start: 2023-10-23

## 2023-11-29 LAB
EXTERNAL ABO GROUPING: NORMAL
EXTERNAL HEPATITIS B SURFACE ANTIGEN: NEGATIVE
EXTERNAL HEPATITIS C AB: NORMAL
EXTERNAL RH FACTOR: POSITIVE
EXTERNAL RUBELLA QUALITATIVE: NORMAL
EXTERNAL SYPHILIS ANTIBODY: NEGATIVE
HIV1 P24 AG SERPL QL IA: NORMAL

## 2023-12-17 DIAGNOSIS — L40.9 PSORIASIS: ICD-10-CM

## 2023-12-17 RX ORDER — TRIAMCINOLONE ACETONIDE 1 MG/G
CREAM TOPICAL
Qty: 80 G | Refills: 3 | Status: CANCELLED | OUTPATIENT
Start: 2023-12-17

## 2023-12-18 RX ORDER — TRIAMCINOLONE ACETONIDE 1 MG/G
CREAM TOPICAL
Qty: 80 G | Refills: 3 | Status: SHIPPED | OUTPATIENT
Start: 2023-12-18

## 2024-04-07 DIAGNOSIS — L40.9 PSORIASIS: ICD-10-CM

## 2024-04-07 RX ORDER — TRIAMCINOLONE ACETONIDE 1 MG/G
CREAM TOPICAL
Qty: 80 G | Refills: 3 | Status: SHIPPED | OUTPATIENT
Start: 2024-04-07

## 2024-04-10 ENCOUNTER — PHARMACY IMMUNIZATION REVIEW (OUTPATIENT)
Dept: PHARMACY | Facility: HOSPITAL | Age: 34
End: 2024-04-10
Payer: COMMERCIAL

## 2024-04-10 NOTE — PROGRESS NOTES
[facility] Vaccination Administration    Patient reported to [facility] via referral from [facility] on 4/10/24.     Allergies:    Patient has no known allergies.    Vaccine History:   Immunization History   Administered Date(s) Administered    COVID-19 (PFIZER) Purple Cap Monovalent 03/16/2021, 04/06/2021, 12/13/2021    Hepatitis A 04/25/2018    Influenza, Unspecified 10/09/2020    Tdap 06/07/2019       Plan:    The following vaccines were administered today:  Tdap    Lela Carvajal, Pharmacy Technician  4/10/2024  11:59 EDT.

## 2024-04-10 NOTE — PROGRESS NOTES
I have reviewed the notes, assessments, and/or procedures performed by Kaitlin Carvajal, pharmacy technician, I concur with her documentation of Alicia Ferrer.

## 2024-04-20 DIAGNOSIS — F41.9 ANXIETY: ICD-10-CM

## 2024-04-20 RX ORDER — ESCITALOPRAM OXALATE 20 MG/1
20 TABLET ORAL DAILY
Qty: 90 TABLET | Refills: 1 | Status: CANCELLED | OUTPATIENT
Start: 2024-04-20

## 2024-04-22 DIAGNOSIS — F41.9 ANXIETY: ICD-10-CM

## 2024-04-22 RX ORDER — ESCITALOPRAM OXALATE 20 MG/1
20 TABLET ORAL DAILY
Qty: 90 TABLET | Refills: 1 | Status: SHIPPED | OUTPATIENT
Start: 2024-04-22

## 2024-05-11 ENCOUNTER — HOSPITAL ENCOUNTER (OUTPATIENT)
Facility: HOSPITAL | Age: 34
Discharge: HOME | End: 2024-05-11
Attending: OBSTETRICS & GYNECOLOGY | Admitting: OBSTETRICS & GYNECOLOGY
Payer: COMMERCIAL

## 2024-05-11 LAB — HGB F BLD QL KLEIH BETKE: NORMAL

## 2024-05-11 PROCEDURE — G0463 HOSPITAL OUTPT CLINIC VISIT: HCPCS

## 2024-05-11 PROCEDURE — 85460 HEMOGLOBIN FETAL: CPT | Performed by: OBSTETRICS & GYNECOLOGY

## 2024-05-11 RX ORDER — LIDOCAINE HYDROCHLORIDE 10 MG/ML
0.5 INJECTION, SOLUTION EPIDURAL; INFILTRATION; INTRACAUDAL; PERINEURAL ONCE AS NEEDED
Status: DISCONTINUED | OUTPATIENT
Start: 2024-05-11 | End: 2024-05-12 | Stop reason: HOSPADM

## 2024-05-11 RX ORDER — FAMOTIDINE 20 MG/1
20 TABLET, FILM COATED ORAL 2 TIMES DAILY PRN
Status: DISCONTINUED | OUTPATIENT
Start: 2024-05-11 | End: 2024-05-12 | Stop reason: HOSPADM

## 2024-05-12 VITALS
BODY MASS INDEX: 45.99 KG/M2 | HEART RATE: 89 BPM | HEIGHT: 67 IN | WEIGHT: 293 LBS | RESPIRATION RATE: 18 BRPM | OXYGEN SATURATION: 96 % | SYSTOLIC BLOOD PRESSURE: 152 MMHG | DIASTOLIC BLOOD PRESSURE: 75 MMHG

## 2024-05-12 NOTE — NURSING NOTE
Pt arrived to hospital with c/o trip and fall onto abdomen at approximately 1845 today.  No bleeding/leaking.  Pt does not feel contractions at this time.  No fetal movement since incident. Abdomen palpates soft, non-tender per patient.

## 2024-05-15 LAB — EXTERNAL GROUP B STREP ANTIGEN: POSITIVE

## 2024-05-29 ENCOUNTER — HOSPITAL ENCOUNTER (OUTPATIENT)
Dept: LABOR AND DELIVERY | Facility: HOSPITAL | Age: 34
Discharge: HOME | End: 2024-05-29
Payer: COMMERCIAL

## 2024-05-29 DIAGNOSIS — Z01.818 PREOP TESTING: Primary | ICD-10-CM

## 2024-05-29 RX ORDER — ASPIRIN 81 MG/1
81 TABLET ORAL DAILY
COMMUNITY

## 2024-05-29 NOTE — NURSING NOTE
PAT OB Navigator completed with patient via telephone encounter.  Instructed patient and support person to arrive on 24 by 0500.  Reviewed preop instructions, Nothing to eat at least 8 hours prior to ,  reviewed presurgical showers (patient received surgical wash, wipes, and instruction sheet at OB office), reviewed preadmission ERACS protocol (patient states will get Gatorade)  Instructed if any pregnancy concerns/labor symptoms prior to  to notify  and come to hospital ED as soon as possible, pt verbalized understanding of all.     Confirmed for Preop labs on 24 at 10am.

## 2024-05-30 ENCOUNTER — ANESTHESIA EVENT (OUTPATIENT)
Dept: LABOR AND DELIVERY | Facility: HOSPITAL | Age: 34
End: 2024-05-30
Payer: COMMERCIAL

## 2024-05-30 ENCOUNTER — LAB (OUTPATIENT)
Dept: LAB | Facility: HOSPITAL | Age: 34
End: 2024-05-30
Payer: COMMERCIAL

## 2024-05-30 ENCOUNTER — PREP FOR SURGERY (OUTPATIENT)
Dept: OTHER | Facility: HOSPITAL | Age: 34
End: 2024-05-30
Payer: COMMERCIAL

## 2024-05-30 ENCOUNTER — HOSPITAL ENCOUNTER (OUTPATIENT)
Dept: LABOR AND DELIVERY | Facility: HOSPITAL | Age: 34
Discharge: HOME | End: 2024-05-30
Payer: COMMERCIAL

## 2024-05-30 DIAGNOSIS — Z01.818 PREOP TESTING: ICD-10-CM

## 2024-05-30 DIAGNOSIS — O10.913 PRE-EXISTING HYPERTENSION COMPLICATING PREGNANCY IN THIRD TRIMESTER: Primary | ICD-10-CM

## 2024-05-30 LAB
ABO GROUP BLD: NORMAL
BASOPHILS # BLD AUTO: 0.02 10*3/MM3 (ref 0–0.2)
BASOPHILS NFR BLD AUTO: 0.2 % (ref 0–1.5)
BLD GP AB SCN SERPL QL: NEGATIVE
DEPRECATED RDW RBC AUTO: 39.2 FL (ref 37–54)
EOSINOPHIL # BLD AUTO: 0.07 10*3/MM3 (ref 0–0.4)
EOSINOPHIL NFR BLD AUTO: 0.6 % (ref 0.3–6.2)
ERYTHROCYTE [DISTWIDTH] IN BLOOD BY AUTOMATED COUNT: 13.7 % (ref 12.3–15.4)
HCT VFR BLD AUTO: 33.4 % (ref 34–46.6)
HGB BLD-MCNC: 11.3 G/DL (ref 12–15.9)
HIV 1+2 AB+HIV1 P24 AG SERPL QL IA: NORMAL
IMM GRANULOCYTES # BLD AUTO: 0.08 10*3/MM3 (ref 0–0.05)
IMM GRANULOCYTES NFR BLD AUTO: 0.7 % (ref 0–0.5)
LYMPHOCYTES # BLD AUTO: 2.32 10*3/MM3 (ref 0.7–3.1)
LYMPHOCYTES NFR BLD AUTO: 19 % (ref 19.6–45.3)
MCH RBC QN AUTO: 27 PG (ref 26.6–33)
MCHC RBC AUTO-ENTMCNC: 33.8 G/DL (ref 31.5–35.7)
MCV RBC AUTO: 79.9 FL (ref 79–97)
MONOCYTES # BLD AUTO: 0.78 10*3/MM3 (ref 0.1–0.9)
MONOCYTES NFR BLD AUTO: 6.4 % (ref 5–12)
NEUTROPHILS NFR BLD AUTO: 73.1 % (ref 42.7–76)
NEUTROPHILS NFR BLD AUTO: 8.91 10*3/MM3 (ref 1.7–7)
NRBC BLD AUTO-RTO: 0 /100 WBC (ref 0–0.2)
PLATELET # BLD AUTO: 209 10*3/MM3 (ref 140–450)
PMV BLD AUTO: 10.4 FL (ref 6–12)
RBC # BLD AUTO: 4.18 10*6/MM3 (ref 3.77–5.28)
RH BLD: POSITIVE
T PALLIDUM IGG SER QL: NORMAL
T&S EXPIRATION DATE: NORMAL
WBC NRBC COR # BLD AUTO: 12.18 10*3/MM3 (ref 3.4–10.8)

## 2024-05-30 PROCEDURE — G0432 EIA HIV-1/HIV-2 SCREEN: HCPCS

## 2024-05-30 PROCEDURE — 86900 BLOOD TYPING SEROLOGIC ABO: CPT

## 2024-05-30 PROCEDURE — 36415 COLL VENOUS BLD VENIPUNCTURE: CPT

## 2024-05-30 PROCEDURE — 85025 COMPLETE CBC W/AUTO DIFF WBC: CPT

## 2024-05-30 PROCEDURE — 86901 BLOOD TYPING SEROLOGIC RH(D): CPT

## 2024-05-30 PROCEDURE — 86850 RBC ANTIBODY SCREEN: CPT

## 2024-05-30 PROCEDURE — 86780 TREPONEMA PALLIDUM: CPT

## 2024-05-30 RX ORDER — LIDOCAINE HYDROCHLORIDE 10 MG/ML
0.5 INJECTION, SOLUTION EPIDURAL; INFILTRATION; INTRACAUDAL; PERINEURAL ONCE AS NEEDED
Status: CANCELLED | OUTPATIENT
Start: 2024-05-30

## 2024-05-30 RX ORDER — METOCLOPRAMIDE HYDROCHLORIDE 5 MG/ML
10 INJECTION INTRAMUSCULAR; INTRAVENOUS ONCE AS NEEDED
Status: CANCELLED | OUTPATIENT
Start: 2024-05-30

## 2024-05-30 RX ORDER — SODIUM CHLORIDE 0.9 % (FLUSH) 0.9 %
10 SYRINGE (ML) INJECTION EVERY 12 HOURS SCHEDULED
Status: CANCELLED | OUTPATIENT
Start: 2024-05-30

## 2024-05-30 RX ORDER — FAMOTIDINE 10 MG/ML
20 INJECTION, SOLUTION INTRAVENOUS ONCE
Status: CANCELLED | OUTPATIENT
Start: 2024-05-30 | End: 2024-05-30

## 2024-05-30 RX ORDER — CARBOPROST TROMETHAMINE 250 UG/ML
250 INJECTION, SOLUTION INTRAMUSCULAR AS NEEDED
Status: CANCELLED | OUTPATIENT
Start: 2024-05-30

## 2024-05-30 RX ORDER — KETOROLAC TROMETHAMINE 30 MG/ML
30 INJECTION, SOLUTION INTRAMUSCULAR; INTRAVENOUS ONCE
Status: CANCELLED | OUTPATIENT
Start: 2024-05-30 | End: 2024-05-30

## 2024-05-30 RX ORDER — MISOPROSTOL 200 UG/1
800 TABLET ORAL AS NEEDED
Status: CANCELLED | OUTPATIENT
Start: 2024-05-30

## 2024-05-30 RX ORDER — ACETAMINOPHEN 500 MG
1000 TABLET ORAL ONCE
Status: CANCELLED | OUTPATIENT
Start: 2024-05-30 | End: 2024-05-30

## 2024-05-30 RX ORDER — METHYLERGONOVINE MALEATE 0.2 MG/ML
200 INJECTION INTRAVENOUS ONCE AS NEEDED
Status: CANCELLED | OUTPATIENT
Start: 2024-05-30

## 2024-05-30 RX ORDER — SODIUM CHLORIDE 9 MG/ML
40 INJECTION, SOLUTION INTRAVENOUS AS NEEDED
Status: CANCELLED | OUTPATIENT
Start: 2024-05-30

## 2024-05-30 RX ORDER — CITRIC ACID/SODIUM CITRATE 334-500MG
30 SOLUTION, ORAL ORAL ONCE
Status: CANCELLED | OUTPATIENT
Start: 2024-05-30 | End: 2024-05-30

## 2024-05-30 RX ORDER — OXYTOCIN-SODIUM CHLORIDE 0.9% IV SOLN 30 UNIT/500ML 30-0.9/5 UT/ML-%
250 SOLUTION INTRAVENOUS CONTINUOUS
Status: CANCELLED | OUTPATIENT
Start: 2024-05-30 | End: 2024-05-30

## 2024-05-30 RX ORDER — OXYTOCIN-SODIUM CHLORIDE 0.9% IV SOLN 30 UNIT/500ML 30-0.9/5 UT/ML-%
999 SOLUTION INTRAVENOUS ONCE
Status: CANCELLED | OUTPATIENT
Start: 2024-05-30 | End: 2024-05-30

## 2024-05-30 RX ORDER — SODIUM CHLORIDE 0.9 % (FLUSH) 0.9 %
10 SYRINGE (ML) INJECTION AS NEEDED
Status: CANCELLED | OUTPATIENT
Start: 2024-05-30

## 2024-05-30 NOTE — H&P
Obstetric History and Physical     Chief Complaint: Repeat     Subjective     Patient is a 34 y.o. female  with EDC of 24, who presents for scheduled repeat .    Her prenatal care is complicated by CHTN well controlled on Labetalol, maternal obesity, and abnl 1hr nl 3hr GTT.  Her previous obstetric/gynecological history is noted for prior C/S x 1 desiring repeat.  Desiring sterilization.      Prenatal Information:  See Office H&P for labs and full details       Past OB History:       OB History    Para Term  AB Living   2 1 1 0 0 1   SAB IAB Ectopic Molar Multiple Live Births   0 0 0 0 0 1               Past Medical History: Past Medical History:   Diagnosis Date    Anxiety     Just now put on med little more than yr ago    Gestational diabetes     Gestational hypertension     Hypertension     Migraine     Psoriasis     scalp and abdomen, elbows    Tremor     benign-- Dr. Seipel diagnosed    Urinary tract infection     recurrent    Varicella         Past Surgical History Past Surgical History:   Procedure Laterality Date     SECTION  2019    EYE SURGERY  2006    Lasik    KNEE SURGERY  2003    OTHER SURGICAL HISTORY  2000    birth hector removal-head    TONSILLECTOMY AND ADENOIDECTOMY  2006    WISDOM TOOTH EXTRACTION          Family History: Family History   Problem Relation Age of Onset    Hypertension Father     Hypertension Mother     Hyperlipidemia Mother     Hypertension Sister     Depression Sister     Hyperlipidemia Sister     Miscarriages / Stillbirths Sister     Hyperlipidemia Sister     Heart disease Paternal Grandfather     Stroke Paternal Grandmother     Heart disease Paternal Grandmother     Heart disease Maternal Grandmother     Cancer Maternal Grandfather     Lung cancer Maternal Grandfather       Social History:  reports that she has quit smoking. Her smoking use included cigarettes. She has never used smokeless tobacco.   reports  that she does not currently use alcohol.   reports no history of drug use.        General ROS: Pertinent items are noted in HPI    Objective      Physical Exam performed at last office visit    Fetal Heart Rate Assessment:   + FHTs in the office         Physical Exam:     General Appearance:    Alert, cooperative, in no acute distress   Abdomen:     Soft, non-tender, no guarding, no rebound tenderness,       EFW 7#9oz - 8#0oz   Extremities:   Moves all extremities well, no edema, no cyanosis, no              redness   Skin:   No bleeding, bruising or rash   Neurologic:   No focal neurologic defect          Laboratory Results:   Lab Results (last 48 hours)       ** No results found for the last 48 hours. **               Assessment & Plan     Active Problems:    * No active hospital problems. *         Assessment:  1.  Intrauterine pregnancy at 37+2 wks gestation on day of scheduled arrival c CHTN controlled c Labetalol.     2.  Prior C/S desiring repeat  3.  GBS status:Negative    Plan:  1. Repeat LTCS  2. Plan of care has been reviewed with patient.  3.  Risks, benefits of treatment plan have been discussed.  4.  All questions have been answered.         Luanne Streeter MD   5/30/2024   17:01 EDT

## 2024-05-31 ENCOUNTER — ANESTHESIA (OUTPATIENT)
Dept: LABOR AND DELIVERY | Facility: HOSPITAL | Age: 34
End: 2024-05-31
Payer: COMMERCIAL

## 2024-05-31 ENCOUNTER — HOSPITAL ENCOUNTER (INPATIENT)
Facility: HOSPITAL | Age: 34
LOS: 2 days | Discharge: HOME | End: 2024-06-02
Attending: OBSTETRICS & GYNECOLOGY | Admitting: OBSTETRICS & GYNECOLOGY
Payer: COMMERCIAL

## 2024-05-31 PROBLEM — O34.219 H/O CESAREAN SECTION COMPLICATING PREGNANCY: Status: ACTIVE | Noted: 2024-05-31

## 2024-05-31 PROCEDURE — 25010000002 CEFAZOLIN 3 G RECONSTITUTED SOLUTION 1 EACH VIAL: Performed by: OBSTETRICS & GYNECOLOGY

## 2024-05-31 PROCEDURE — 25010000002 FENTANYL CITRATE (PF) 100 MCG/2ML SOLUTION: Performed by: NURSE ANESTHETIST, CERTIFIED REGISTERED

## 2024-05-31 PROCEDURE — 25810000003 LACTATED RINGERS PER 1000 ML: Performed by: NURSE ANESTHETIST, CERTIFIED REGISTERED

## 2024-05-31 PROCEDURE — 25010000002 GLYCOPYRROLATE 1 MG/5ML SOLUTION PREFILLED SYRINGE: Performed by: NURSE ANESTHETIST, CERTIFIED REGISTERED

## 2024-05-31 PROCEDURE — 25810000003 SODIUM CHLORIDE 0.9 % SOLUTION 250 ML FLEX CONT: Performed by: NURSE ANESTHETIST, CERTIFIED REGISTERED

## 2024-05-31 PROCEDURE — 25010000002 KETOROLAC TROMETHAMINE PER 15 MG: Performed by: OBSTETRICS & GYNECOLOGY

## 2024-05-31 PROCEDURE — 25010000002 KETOROLAC TROMETHAMINE PER 15 MG: Performed by: NURSE ANESTHETIST, CERTIFIED REGISTERED

## 2024-05-31 PROCEDURE — 0UB70ZZ EXCISION OF BILATERAL FALLOPIAN TUBES, OPEN APPROACH: ICD-10-PCS | Performed by: OBSTETRICS & GYNECOLOGY

## 2024-05-31 PROCEDURE — 25010000002 PHENYLEPHRINE 10 MG/ML SOLUTION 5 ML VIAL: Performed by: NURSE ANESTHETIST, CERTIFIED REGISTERED

## 2024-05-31 PROCEDURE — 25810000003 LACTATED RINGERS SOLUTION: Performed by: OBSTETRICS & GYNECOLOGY

## 2024-05-31 PROCEDURE — 25010000002 ONDANSETRON PER 1 MG: Performed by: NURSE ANESTHETIST, CERTIFIED REGISTERED

## 2024-05-31 PROCEDURE — 25010000002 BUPIVACAINE PF 0.75 % SOLUTION: Performed by: NURSE ANESTHETIST, CERTIFIED REGISTERED

## 2024-05-31 PROCEDURE — 25010000002 MORPHINE PER 10 MG: Performed by: NURSE ANESTHETIST, CERTIFIED REGISTERED

## 2024-05-31 PROCEDURE — 25010000002 OXYTOCIN PER 10 UNITS: Performed by: NURSE ANESTHETIST, CERTIFIED REGISTERED

## 2024-05-31 PROCEDURE — 88302 TISSUE EXAM BY PATHOLOGIST: CPT | Performed by: OBSTETRICS & GYNECOLOGY

## 2024-05-31 RX ORDER — DIPHENHYDRAMINE HYDROCHLORIDE 50 MG/ML
25 INJECTION INTRAMUSCULAR; INTRAVENOUS EVERY 4 HOURS PRN
Status: DISCONTINUED | OUTPATIENT
Start: 2024-05-31 | End: 2024-06-02 | Stop reason: HOSPADM

## 2024-05-31 RX ORDER — FAMOTIDINE 10 MG/ML
20 INJECTION, SOLUTION INTRAVENOUS ONCE
Status: COMPLETED | OUTPATIENT
Start: 2024-05-31 | End: 2024-05-31

## 2024-05-31 RX ORDER — GLYCOPYRROLATE 1 MG/5 ML
SYRINGE (ML) INTRAVENOUS AS NEEDED
Status: DISCONTINUED | OUTPATIENT
Start: 2024-05-31 | End: 2024-05-31 | Stop reason: SURG

## 2024-05-31 RX ORDER — SIMETHICONE 80 MG
80 TABLET,CHEWABLE ORAL 4 TIMES DAILY PRN
Status: DISCONTINUED | OUTPATIENT
Start: 2024-05-31 | End: 2024-06-02 | Stop reason: HOSPADM

## 2024-05-31 RX ORDER — DIPHENHYDRAMINE HCL 25 MG
25 CAPSULE ORAL EVERY 4 HOURS PRN
Status: DISCONTINUED | OUTPATIENT
Start: 2024-05-31 | End: 2024-06-02 | Stop reason: HOSPADM

## 2024-05-31 RX ORDER — DOCUSATE SODIUM 100 MG/1
100 CAPSULE, LIQUID FILLED ORAL 2 TIMES DAILY PRN
Status: DISCONTINUED | OUTPATIENT
Start: 2024-05-31 | End: 2024-06-02 | Stop reason: HOSPADM

## 2024-05-31 RX ORDER — OXYCODONE HYDROCHLORIDE 5 MG/1
10 TABLET ORAL EVERY 4 HOURS PRN
Status: DISCONTINUED | OUTPATIENT
Start: 2024-05-31 | End: 2024-06-02 | Stop reason: HOSPADM

## 2024-05-31 RX ORDER — OXYTOCIN 10 [USP'U]/ML
INJECTION, SOLUTION INTRAMUSCULAR; INTRAVENOUS AS NEEDED
Status: DISCONTINUED | OUTPATIENT
Start: 2024-05-31 | End: 2024-05-31 | Stop reason: SURG

## 2024-05-31 RX ORDER — BUPIVACAINE HYDROCHLORIDE 7.5 MG/ML
INJECTION, SOLUTION EPIDURAL; RETROBULBAR AS NEEDED
Status: DISCONTINUED | OUTPATIENT
Start: 2024-05-31 | End: 2024-05-31 | Stop reason: SURG

## 2024-05-31 RX ORDER — SODIUM CHLORIDE 9 MG/ML
40 INJECTION, SOLUTION INTRAVENOUS AS NEEDED
Status: DISCONTINUED | OUTPATIENT
Start: 2024-05-31 | End: 2024-05-31

## 2024-05-31 RX ORDER — FENTANYL CITRATE 50 UG/ML
INJECTION, SOLUTION INTRAMUSCULAR; INTRAVENOUS AS NEEDED
Status: DISCONTINUED | OUTPATIENT
Start: 2024-05-31 | End: 2024-05-31 | Stop reason: SURG

## 2024-05-31 RX ORDER — CITRIC ACID/SODIUM CITRATE 334-500MG
30 SOLUTION, ORAL ORAL ONCE
Status: COMPLETED | OUTPATIENT
Start: 2024-05-31 | End: 2024-05-31

## 2024-05-31 RX ORDER — OXYCODONE HYDROCHLORIDE 5 MG/1
5 TABLET ORAL EVERY 4 HOURS PRN
Status: DISCONTINUED | OUTPATIENT
Start: 2024-05-31 | End: 2024-05-31

## 2024-05-31 RX ORDER — SODIUM CHLORIDE 0.9 % (FLUSH) 0.9 %
10 SYRINGE (ML) INJECTION AS NEEDED
Status: DISCONTINUED | OUTPATIENT
Start: 2024-05-31 | End: 2024-05-31

## 2024-05-31 RX ORDER — OXYTOCIN/0.9 % SODIUM CHLORIDE 30/500 ML
PLASTIC BAG, INJECTION (ML) INTRAVENOUS
Status: COMPLETED
Start: 2024-05-31 | End: 2024-05-31

## 2024-05-31 RX ORDER — MORPHINE SULFATE 1 MG/ML
INJECTION, SOLUTION EPIDURAL; INTRATHECAL; INTRAVENOUS AS NEEDED
Status: DISCONTINUED | OUTPATIENT
Start: 2024-05-31 | End: 2024-05-31 | Stop reason: SURG

## 2024-05-31 RX ORDER — ACETAMINOPHEN 325 MG/1
650 TABLET ORAL EVERY 6 HOURS
Status: DISCONTINUED | OUTPATIENT
Start: 2024-06-01 | End: 2024-06-02 | Stop reason: HOSPADM

## 2024-05-31 RX ORDER — OXYTOCIN-SODIUM CHLORIDE 0.9% IV SOLN 30 UNIT/500ML 30-0.9/5 UT/ML-%
125 SOLUTION INTRAVENOUS ONCE AS NEEDED
Status: DISCONTINUED | OUTPATIENT
Start: 2024-05-31 | End: 2024-06-02 | Stop reason: HOSPADM

## 2024-05-31 RX ORDER — KETOROLAC TROMETHAMINE 30 MG/ML
30 INJECTION, SOLUTION INTRAMUSCULAR; INTRAVENOUS EVERY 6 HOURS
Status: DISCONTINUED | OUTPATIENT
Start: 2024-05-31 | End: 2024-05-31

## 2024-05-31 RX ORDER — ACETAMINOPHEN 500 MG
1000 TABLET ORAL EVERY 6 HOURS
Status: DISPENSED | OUTPATIENT
Start: 2024-05-31 | End: 2024-06-01

## 2024-05-31 RX ORDER — SODIUM CHLORIDE, SODIUM LACTATE, POTASSIUM CHLORIDE, CALCIUM CHLORIDE 600; 310; 30; 20 MG/100ML; MG/100ML; MG/100ML; MG/100ML
INJECTION, SOLUTION INTRAVENOUS CONTINUOUS PRN
Status: DISCONTINUED | OUTPATIENT
Start: 2024-05-31 | End: 2024-05-31 | Stop reason: SURG

## 2024-05-31 RX ORDER — OXYTOCIN/0.9 % SODIUM CHLORIDE 30/500 ML
250 PLASTIC BAG, INJECTION (ML) INTRAVENOUS CONTINUOUS
Status: ACTIVE | OUTPATIENT
Start: 2024-05-31 | End: 2024-05-31

## 2024-05-31 RX ORDER — ACETAMINOPHEN 500 MG
1000 TABLET ORAL EVERY 6 HOURS
Status: DISCONTINUED | OUTPATIENT
Start: 2024-05-31 | End: 2024-05-31

## 2024-05-31 RX ORDER — LABETALOL 100 MG/1
100 TABLET, FILM COATED ORAL 2 TIMES DAILY
Status: DISCONTINUED | OUTPATIENT
Start: 2024-05-31 | End: 2024-06-02 | Stop reason: HOSPADM

## 2024-05-31 RX ORDER — ONDANSETRON 2 MG/ML
INJECTION INTRAMUSCULAR; INTRAVENOUS AS NEEDED
Status: DISCONTINUED | OUTPATIENT
Start: 2024-05-31 | End: 2024-05-31 | Stop reason: SURG

## 2024-05-31 RX ORDER — OXYCODONE HYDROCHLORIDE 5 MG/1
5 TABLET ORAL EVERY 4 HOURS PRN
Status: DISCONTINUED | OUTPATIENT
Start: 2024-05-31 | End: 2024-06-02 | Stop reason: HOSPADM

## 2024-05-31 RX ORDER — ONDANSETRON 2 MG/ML
4 INJECTION INTRAMUSCULAR; INTRAVENOUS ONCE AS NEEDED
Status: DISPENSED | OUTPATIENT
Start: 2024-05-31 | End: 2024-06-01

## 2024-05-31 RX ORDER — IBUPROFEN 600 MG/1
600 TABLET ORAL EVERY 6 HOURS
Status: DISCONTINUED | OUTPATIENT
Start: 2024-06-01 | End: 2024-06-02 | Stop reason: HOSPADM

## 2024-05-31 RX ORDER — KETOROLAC TROMETHAMINE 30 MG/ML
INJECTION, SOLUTION INTRAMUSCULAR; INTRAVENOUS AS NEEDED
Status: DISCONTINUED | OUTPATIENT
Start: 2024-05-31 | End: 2024-05-31 | Stop reason: SURG

## 2024-05-31 RX ORDER — MISOPROSTOL 200 UG/1
800 TABLET ORAL AS NEEDED
Status: DISCONTINUED | OUTPATIENT
Start: 2024-05-31 | End: 2024-05-31 | Stop reason: HOSPADM

## 2024-05-31 RX ORDER — OXYTOCIN/0.9 % SODIUM CHLORIDE 30/500 ML
999 PLASTIC BAG, INJECTION (ML) INTRAVENOUS ONCE
Status: DISCONTINUED | OUTPATIENT
Start: 2024-05-31 | End: 2024-05-31 | Stop reason: HOSPADM

## 2024-05-31 RX ORDER — OXYTOCIN/0.9 % SODIUM CHLORIDE 30/500 ML
PLASTIC BAG, INJECTION (ML) INTRAVENOUS CONTINUOUS PRN
Status: DISCONTINUED | OUTPATIENT
Start: 2024-05-31 | End: 2024-05-31 | Stop reason: SURG

## 2024-05-31 RX ORDER — OXYCODONE HYDROCHLORIDE 5 MG/1
10 TABLET ORAL EVERY 4 HOURS PRN
Status: DISCONTINUED | OUTPATIENT
Start: 2024-05-31 | End: 2024-05-31

## 2024-05-31 RX ORDER — SODIUM CHLORIDE 0.9 % (FLUSH) 0.9 %
10 SYRINGE (ML) INJECTION EVERY 12 HOURS SCHEDULED
Status: DISCONTINUED | OUTPATIENT
Start: 2024-05-31 | End: 2024-05-31

## 2024-05-31 RX ORDER — METHYLERGONOVINE MALEATE 0.2 MG/ML
200 INJECTION INTRAVENOUS ONCE AS NEEDED
Status: DISCONTINUED | OUTPATIENT
Start: 2024-05-31 | End: 2024-05-31

## 2024-05-31 RX ORDER — METOCLOPRAMIDE HYDROCHLORIDE 5 MG/ML
10 INJECTION INTRAMUSCULAR; INTRAVENOUS ONCE AS NEEDED
Status: DISCONTINUED | OUTPATIENT
Start: 2024-05-31 | End: 2024-05-31 | Stop reason: HOSPADM

## 2024-05-31 RX ORDER — KETOROLAC TROMETHAMINE 30 MG/ML
15 INJECTION, SOLUTION INTRAMUSCULAR; INTRAVENOUS EVERY 6 HOURS
Status: COMPLETED | OUTPATIENT
Start: 2024-05-31 | End: 2024-06-01

## 2024-05-31 RX ORDER — CARBOPROST TROMETHAMINE 250 UG/ML
250 INJECTION, SOLUTION INTRAMUSCULAR AS NEEDED
Status: DISCONTINUED | OUTPATIENT
Start: 2024-05-31 | End: 2024-05-31

## 2024-05-31 RX ORDER — LIDOCAINE HYDROCHLORIDE 10 MG/ML
0.5 INJECTION, SOLUTION EPIDURAL; INFILTRATION; INTRACAUDAL; PERINEURAL ONCE AS NEEDED
Status: DISCONTINUED | OUTPATIENT
Start: 2024-05-31 | End: 2024-05-31

## 2024-05-31 RX ORDER — KETOROLAC TROMETHAMINE 30 MG/ML
30 INJECTION, SOLUTION INTRAMUSCULAR; INTRAVENOUS ONCE
Status: DISCONTINUED | OUTPATIENT
Start: 2024-05-31 | End: 2024-05-31 | Stop reason: HOSPADM

## 2024-05-31 RX ORDER — ACETAMINOPHEN 500 MG
1000 TABLET ORAL ONCE
Status: COMPLETED | OUTPATIENT
Start: 2024-05-31 | End: 2024-05-31

## 2024-05-31 RX ORDER — ESCITALOPRAM OXALATE 10 MG/1
20 TABLET ORAL DAILY
Status: DISCONTINUED | OUTPATIENT
Start: 2024-05-31 | End: 2024-06-02 | Stop reason: HOSPADM

## 2024-05-31 RX ADMIN — OXYCODONE 5 MG: 5 TABLET ORAL at 22:28

## 2024-05-31 RX ADMIN — ACETAMINOPHEN 1000 MG: 500 TABLET, FILM COATED ORAL at 18:32

## 2024-05-31 RX ADMIN — OXYCODONE 5 MG: 5 TABLET ORAL at 17:36

## 2024-05-31 RX ADMIN — FAMOTIDINE 20 MG: 10 INJECTION INTRAVENOUS at 07:24

## 2024-05-31 RX ADMIN — FENTANYL CITRATE 10 MCG: 50 INJECTION, SOLUTION INTRAMUSCULAR; INTRAVENOUS at 07:59

## 2024-05-31 RX ADMIN — BUPIVACAINE HYDROCHLORIDE 1.6 ML: 7.5 INJECTION, SOLUTION EPIDURAL; RETROBULBAR at 07:59

## 2024-05-31 RX ADMIN — SODIUM CHLORIDE 3000 MG: 900 INJECTION INTRAVENOUS at 07:42

## 2024-05-31 RX ADMIN — PHENYLEPHRINE HYDROCHLORIDE 0.4 MCG/KG/MIN: 10 INJECTION INTRAVENOUS at 08:01

## 2024-05-31 RX ADMIN — SODIUM CHLORIDE, SODIUM LACTATE, POTASSIUM CHLORIDE, AND CALCIUM CHLORIDE: .6; .31; .03; .02 INJECTION, SOLUTION INTRAVENOUS at 07:48

## 2024-05-31 RX ADMIN — SODIUM CITRATE AND CITRIC ACID MONOHYDRATE 30 ML: 500; 334 SOLUTION ORAL at 07:24

## 2024-05-31 RX ADMIN — KETOROLAC TROMETHAMINE 15 MG: 30 INJECTION, SOLUTION INTRAMUSCULAR at 14:37

## 2024-05-31 RX ADMIN — Medication 125 ML/HR: at 08:24

## 2024-05-31 RX ADMIN — ACETAMINOPHEN 1000 MG: 500 TABLET, FILM COATED ORAL at 07:24

## 2024-05-31 RX ADMIN — KETOROLAC TROMETHAMINE 30 MG: 30 INJECTION, SOLUTION INTRAMUSCULAR at 08:33

## 2024-05-31 RX ADMIN — ONDANSETRON 4 MG: 2 INJECTION INTRAMUSCULAR; INTRAVENOUS at 07:52

## 2024-05-31 RX ADMIN — OXYTOCIN 3 UNITS: 10 INJECTION, SOLUTION INTRAMUSCULAR; INTRAVENOUS at 08:24

## 2024-05-31 RX ADMIN — DOCUSATE SODIUM 100 MG: 100 CAPSULE, LIQUID FILLED ORAL at 20:50

## 2024-05-31 RX ADMIN — KETOROLAC TROMETHAMINE 15 MG: 30 INJECTION, SOLUTION INTRAMUSCULAR at 20:51

## 2024-05-31 RX ADMIN — SODIUM CHLORIDE, POTASSIUM CHLORIDE, SODIUM LACTATE AND CALCIUM CHLORIDE 1000 ML: 600; 310; 30; 20 INJECTION, SOLUTION INTRAVENOUS at 06:03

## 2024-05-31 RX ADMIN — MORPHINE SULFATE 100 MCG: 1 INJECTION, SOLUTION EPIDURAL; INTRATHECAL; INTRAVENOUS at 07:59

## 2024-05-31 RX ADMIN — ONDANSETRON 4 MG: 2 INJECTION INTRAMUSCULAR; INTRAVENOUS at 17:36

## 2024-05-31 RX ADMIN — ACETAMINOPHEN 1000 MG: 500 TABLET, FILM COATED ORAL at 12:55

## 2024-05-31 RX ADMIN — LABETALOL HYDROCHLORIDE 100 MG: 100 TABLET, FILM COATED ORAL at 20:50

## 2024-05-31 RX ADMIN — Medication 0.1 MG: at 08:30

## 2024-05-31 NOTE — ANESTHESIA POSTPROCEDURE EVALUATION
Patient: Alicia Ferrer    Procedure Summary       Date: 24 Room / Location: [facility] LABOR DELIVERY  [facility] LABOR DELIVERY    Anesthesia Start: 748 Anesthesia Stop: 901    Procedure:  SECTION REPEAT WITH TUBAL (Abdomen) Diagnosis:       Previous  section      (Previous  section [Z98.891])    Surgeons: Luanne Streeter MD Provider: Bryan Julian MD    Anesthesia Type: spinal ASA Status: 2            Anesthesia Type: spinal    Vitals  Vitals Value Taken Time   /68 24 1001   Temp 97.7 °F (36.5 °C) 24 0916   Pulse 68 24 1001   Resp 16 24 1001   SpO2 100 % 24 1001           Post Anesthesia Care and Evaluation    Patient location during evaluation: PACU  Patient participation: complete - patient participated  Level of consciousness: awake  Pain scale: See nurse's notes for pain score.  Pain management: adequate    Airway patency: patent  Anesthetic complications: No anesthetic complications  PONV Status: none  Cardiovascular status: acceptable  Respiratory status: acceptable and spontaneous ventilation  Hydration status: acceptable  Post Neuraxial Block status: Motor and sensory function returned to baseline and No signs or symptoms of PDPH  Comments: Patient seen and examined postoperatively; vital signs stable; SpO2 greater than or equal to 90%; cardiopulmonary status stable; nausea/vomiting adequately controlled; pain adequately controlled; no apparent anesthesia complications; patient discharged from anesthesia care when discharge criteria were met

## 2024-05-31 NOTE — PLAN OF CARE
Goal Outcome Evaluation:  Plan of Care Reviewed With: patient        Progress: improving  Outcome Evaluation: pt delivered today and is tolerating pain well with PRN medications and scheduled medications. pt bonding with baby and family. pt stood at edge of bed for 5 minutes and is currently sitting at the edge of the bed. pt catheter still in place.

## 2024-05-31 NOTE — PLAN OF CARE
Goal Outcome Evaluation:                           Patient delivered a viable infant female at 0821 via repeat  delivery. APGAR 8&8. Fundus is firm, bleeding is light to scant. Pain is controlled. Waters in place until . Patient bonding with infant. Formula feeding. S/o at bedside and supportive. Verbalizes understanding of edu given

## 2024-05-31 NOTE — L&D DELIVERY NOTE
[facility]   Section Operative Note    Pre-Operative Dx:   1.  Patient is a 34 y.o. female  currently at 37w2d, who presents with scheduled repeat .    2. Prior   3. CHTN  4. Maternal morbid obesity      Postoperative dx:    1.  Same     Procedure: Repeat LTCS w/ BTL     Surgeon:    Assistant:                       Luanne Streeter MD    None       Anesthesia:  Anesthesiologist:  Jesus Manuel Julian     EBL:  800cc     Antibiotics: cefazolin (Ancef)     Infant    Findings: VFI     Apgars: 8 & 8 at 1 and 5 minutes.        Procedure Details:     Pt was taken to the OR where she was prepped and draped in the usual sterile fashion with a catheter and a left tilt.  Anesthesia was found to be adequate.    A Pfannenstiel skin incision was made with the scalpel and carried through to the underlying layer of fascia with the scalpel.  The fascial incision was extended laterally with the Joshua scissors and  from the underlying rectus muscles superiorly.  The rectus muscles were  in the midline and the peritoneum was entered sharply with the Joshua scissors.  The peritoneal incision was extended laterally.  The Jamie retractor was placed.   A low transverse uterine incision was made with the scalpel and extended in a cephalocaudad manner manually.    The infant's head, shoulders, and body were delivered without difficulty.  Nose and mouth were bulb suctioned and infant handed to awaiting nurse with good cry, color, tone, and movement of all extremities.    Placenta was delivered spontaneously intact with a three vessel cord.    The uterus was exteriorized and cleared of all clots and debris.    The uterine incision was repaired with 0 Monocryl in a running, locked fashion and excellent hemostasis was achieved.      The fallopian tube was grasped with the sherry clamp and followed to the fimbriae.  The right tube was grasped and the mesosalpinx was opened.  The tube was doubly  tied with plain gut and the intervening tubal segment was excised and the tubal ends were cauterized in the [place] fashion.  The Left tube was doubly tied, cut, and cauterized in the same fashion.      The uterus was returned to the abdomen, the gutters were cleared of all clots and debris.  The uterine incision was examined and hemostatic in situ.     The peritoneum was reapproximated with 3.0 Monocryl in a running fashion.  The rectus muscles were reapproximated with 0 Monocryl in several simple interrupted sutures.    The fascia was closed with 0 Vicryl in a running, locked fashion.    The subcutaneous fat was irrigated and closed with 3.0 Monocryl.    The skin was closed with 3.0 Vicryl in a running  fashion.  Sponge, lap, and needle counts were correct x 2.        Complications:   None      Disposition:   Mother to Mother Baby/Postpartum  in stable condition currently.   Baby to NBN  in stable condition currently.       Luanne Streeter MD  5/31/2024  08:55 EDT

## 2024-05-31 NOTE — ANESTHESIA PREPROCEDURE EVALUATION
Anesthesia Evaluation     NPO Solid Status: > 8 hours  NPO Liquid Status: > 2 hours           Airway   Mallampati: II  TM distance: >3 FB  Neck ROM: full  No difficulty expected  Dental - normal exam     Pulmonary - normal exam   Cardiovascular - normal exam    (+) hypertension      Neuro/Psych  (+) headaches, tremors, psychiatric history Anxiety  GI/Hepatic/Renal/Endo    (+) diabetes mellitus    Musculoskeletal     Abdominal  - normal exam    Bowel sounds: normal.   Substance History      OB/GYN    (+) Pregnant, pregnancy induced hypertension        Other                    Anesthesia Plan    ASA 2     spinal       Anesthetic plan, risks, benefits, and alternatives have been provided, discussed and informed consent has been obtained with: patient.  Pre-procedure education provided  Plan discussed with CRNA.    CODE STATUS:    Level Of Support Discussed With: Patient  Code Status (Patient has no pulse and is not breathing): CPR (Attempt to Resuscitate)  Medical Interventions (Patient has pulse or is breathing): Full Support

## 2024-05-31 NOTE — ANESTHESIA PROCEDURE NOTES
Spinal Block    Pre-sedation assessment completed: 5/31/2024 7:45 AM    Patient reassessed immediately prior to procedure    Patient location during procedure: OR  Start Time: 5/31/2024 7:50 AM  Stop Time: 5/31/2024 7:58 AM  Indication:at surgeon's request  Performed By  Anesthesiologist: Bryan Julian MD CRNA/CAA: Lisbeth Ventura CRNA  Preanesthetic Checklist  Completed: patient identified, IV checked, site marked, risks and benefits discussed, surgical consent, monitors and equipment checked, pre-op evaluation and timeout performed  Spinal Block Prep:  Patient Position:sitting  Sterile Tech:cap, gloves, mask and sterile barriers  Prep:Chloraprep  Patient Monitoring:blood pressure monitoring, continuous pulse oximetry and EKG    Spinal Block Procedure  Approach:midline  Guidance:landmark technique and palpation technique  Location:L4-L5  Needle Type:Moshe  Needle Gauge:25 G  Placement of Spinal needle event:cerebrospinal fluid aspirated  Paresthesia: no  Fluid Appearance:clear     Post Assessment  Patient Tolerance:patient tolerated the procedure well with no apparent complications  Complications no

## 2024-06-01 LAB
BASOPHILS # BLD AUTO: 0.06 10*3/MM3 (ref 0–0.2)
BASOPHILS NFR BLD AUTO: 0.5 % (ref 0–1.5)
DEPRECATED RDW RBC AUTO: 43.8 FL (ref 37–54)
EOSINOPHIL # BLD AUTO: 0.17 10*3/MM3 (ref 0–0.4)
EOSINOPHIL NFR BLD AUTO: 1.3 % (ref 0.3–6.2)
ERYTHROCYTE [DISTWIDTH] IN BLOOD BY AUTOMATED COUNT: 14.6 % (ref 12.3–15.4)
HCT VFR BLD AUTO: 31.5 % (ref 34–46.6)
HGB BLD-MCNC: 10 G/DL (ref 12–15.9)
IMM GRANULOCYTES # BLD AUTO: 0.1 10*3/MM3 (ref 0–0.05)
IMM GRANULOCYTES NFR BLD AUTO: 0.8 % (ref 0–0.5)
LYMPHOCYTES # BLD AUTO: 3.6 10*3/MM3 (ref 0.7–3.1)
LYMPHOCYTES NFR BLD AUTO: 28.2 % (ref 19.6–45.3)
MCH RBC QN AUTO: 26.4 PG (ref 26.6–33)
MCHC RBC AUTO-ENTMCNC: 31.7 G/DL (ref 31.5–35.7)
MCV RBC AUTO: 83.1 FL (ref 79–97)
MONOCYTES # BLD AUTO: 0.9 10*3/MM3 (ref 0.1–0.9)
MONOCYTES NFR BLD AUTO: 7 % (ref 5–12)
NEUTROPHILS NFR BLD AUTO: 62.2 % (ref 42.7–76)
NEUTROPHILS NFR BLD AUTO: 7.94 10*3/MM3 (ref 1.7–7)
NRBC BLD AUTO-RTO: 0 /100 WBC (ref 0–0.2)
PLATELET # BLD AUTO: 193 10*3/MM3 (ref 140–450)
PMV BLD AUTO: 10 FL (ref 6–12)
RBC # BLD AUTO: 3.79 10*6/MM3 (ref 3.77–5.28)
WBC NRBC COR # BLD AUTO: 12.77 10*3/MM3 (ref 3.4–10.8)

## 2024-06-01 PROCEDURE — 92650 AEP SCR AUDITORY POTENTIAL: CPT

## 2024-06-01 PROCEDURE — 85025 COMPLETE CBC W/AUTO DIFF WBC: CPT | Performed by: OBSTETRICS & GYNECOLOGY

## 2024-06-01 PROCEDURE — 25010000002 KETOROLAC TROMETHAMINE PER 15 MG: Performed by: OBSTETRICS & GYNECOLOGY

## 2024-06-01 RX ADMIN — LABETALOL HYDROCHLORIDE 100 MG: 100 TABLET, FILM COATED ORAL at 21:17

## 2024-06-01 RX ADMIN — ACETAMINOPHEN 1000 MG: 500 TABLET, FILM COATED ORAL at 00:47

## 2024-06-01 RX ADMIN — IBUPROFEN 600 MG: 600 TABLET, FILM COATED ORAL at 21:17

## 2024-06-01 RX ADMIN — OXYCODONE 5 MG: 5 TABLET ORAL at 10:01

## 2024-06-01 RX ADMIN — ACETAMINOPHEN 650 MG: 325 TABLET, FILM COATED ORAL at 12:26

## 2024-06-01 RX ADMIN — ANTI-FUNGAL POWDER MICONAZOLE NITRATE TALC FREE 1 APPLICATION: 1.42 POWDER TOPICAL at 23:55

## 2024-06-01 RX ADMIN — LABETALOL HYDROCHLORIDE 100 MG: 100 TABLET, FILM COATED ORAL at 08:42

## 2024-06-01 RX ADMIN — OXYCODONE 10 MG: 5 TABLET ORAL at 02:44

## 2024-06-01 RX ADMIN — ESCITALOPRAM OXALATE 20 MG: 10 TABLET ORAL at 08:42

## 2024-06-01 RX ADMIN — KETOROLAC TROMETHAMINE 15 MG: 30 INJECTION, SOLUTION INTRAMUSCULAR at 08:42

## 2024-06-01 RX ADMIN — IBUPROFEN 600 MG: 600 TABLET, FILM COATED ORAL at 15:39

## 2024-06-01 RX ADMIN — KETOROLAC TROMETHAMINE 15 MG: 30 INJECTION, SOLUTION INTRAMUSCULAR at 03:51

## 2024-06-01 RX ADMIN — ACETAMINOPHEN 650 MG: 325 TABLET, FILM COATED ORAL at 18:27

## 2024-06-01 RX ADMIN — OXYCODONE 5 MG: 5 TABLET ORAL at 23:53

## 2024-06-01 NOTE — PROGRESS NOTES
[facility]  Postpartum Note    Subjective   Postpartum Day 1:  Repeat Low Transverse  Section    Patient without complaints. Her pain is well controlled with nonsteroidal anti-inflammatory drugs and opioid analgesics. She is ambulating well.  Patient describes her bleeding as thin lochia.    Breastfeeding: declines.    Objective     Vitals:  Vitals:    24 2307 24 0315 24 0735 24 1610   BP: 124/78 122/73 139/79 145/78   BP Location: Right arm Right arm Right arm Right arm   Patient Position: Sitting Sitting Lying Sitting   Pulse: 71 79 71 82   Resp: 17 16 16 17   Temp: 98.1 °F (36.7 °C) 98 °F (36.7 °C) 98 °F (36.7 °C) 97.5 °F (36.4 °C)   TempSrc: Oral Oral Oral Oral   SpO2: 98% 98% 97% 96%   Weight: (!) 146 kg (321 lb)      Height:           Physical Exam:  General:  no acute distress.  Abdomen: Soft, appropriately tender, ND   Fundus firm, below the umbilicus  Incision: clean, dry, intact  Extremities: normal,  trace edema.     Labs:  Lab Results (last 72 hours)       Procedure Component Value Units Date/Time    CBC & Differential [[id number]]  (Abnormal) Collected: 24    Specimen: Blood Updated: 24    Narrative:      The following orders were created for panel order CBC & Differential.  Procedure                               Abnormality         Status                     ---------                               -----------         ------                     CBC Auto Differential[[id number]]        Abnormal            Final result                 Please view results for these tests on the individual orders.    CBC Auto Differential [[id number]]  (Abnormal) Collected: 24    Specimen: Blood Updated: 24     WBC 12.77 10*3/mm3      RBC 3.79 10*6/mm3      Hemoglobin 10.0 g/dL      Hematocrit 31.5 %      MCV 83.1 fL      MCH 26.4 pg      MCHC 31.7 g/dL      RDW 14.6 %      RDW-SD 43.8 fl      MPV 10.0 fL      Platelets 193 10*3/mm3      Neutrophil %  62.2 %      Lymphocyte % 28.2 %      Monocyte % 7.0 %      Eosinophil % 1.3 %      Basophil % 0.5 %      Immature Grans % 0.8 %      Neutrophils, Absolute 7.94 10*3/mm3      Lymphocytes, Absolute 3.60 10*3/mm3      Monocytes, Absolute 0.90 10*3/mm3      Eosinophils, Absolute 0.17 10*3/mm3      Basophils, Absolute 0.06 10*3/mm3      Immature Grans, Absolute 0.10 10*3/mm3      nRBC 0.0 /100 WBC     Tissue Pathology Exam [[id number]] Collected: 24 1028    Specimen: Tissue from Fallopian Tube, Left; Tissue from Fallopian Tube, Right Updated: 24 1142    Tissue Pathology Exam [[id number]] Collected: 24 1028    Specimen: Tissue from Fallopian Tube, Right; Tissue from Fallopian Tube, Left Updated: 24 1140    LABS SCANNED [[id number]] Resulted: 24     Updated: 24 0829    LABS SCANNED [[id number]] Resulted: 24     Updated: 24 0828             Feeding method: Breastfeeding Status: No     Blood Type: RH Positive        Assessment & Plan     Principal Problem:    H/O  section complicating pregnancy  Active Problems:     delivery delivered      Alicia Ferrer is Day 1  post-partum from a  Repeat Low Transverse  Section      Plan:  Continue current care.      Keri Curry MD  2024  17:38 EDT

## 2024-06-01 NOTE — PLAN OF CARE
Goal Outcome Evaluation:      Pt is A&Ox4, on room air, up ad ni. Pt is bottle feeding. Pain is being controlled.

## 2024-06-02 VITALS
BODY MASS INDEX: 45.99 KG/M2 | HEIGHT: 67 IN | SYSTOLIC BLOOD PRESSURE: 129 MMHG | RESPIRATION RATE: 16 BRPM | WEIGHT: 293 LBS | TEMPERATURE: 97.4 F | HEART RATE: 91 BPM | DIASTOLIC BLOOD PRESSURE: 78 MMHG | OXYGEN SATURATION: 99 %

## 2024-06-02 RX ORDER — LABETALOL 100 MG/1
100 TABLET, FILM COATED ORAL 2 TIMES DAILY
Qty: 60 TABLET | Refills: 1 | Status: SHIPPED | OUTPATIENT
Start: 2024-06-02

## 2024-06-02 RX ORDER — IBUPROFEN 600 MG/1
600 TABLET ORAL EVERY 6 HOURS
Qty: 30 TABLET | Refills: 0 | Status: SHIPPED | OUTPATIENT
Start: 2024-06-02 | End: 2024-06-02

## 2024-06-02 RX ORDER — OXYCODONE HYDROCHLORIDE 5 MG/1
5 TABLET ORAL EVERY 6 HOURS PRN
Qty: 12 TABLET | Refills: 0 | Status: SHIPPED | OUTPATIENT
Start: 2024-06-02 | End: 2024-06-05

## 2024-06-02 RX ORDER — OXYCODONE HYDROCHLORIDE 5 MG/1
5 TABLET ORAL EVERY 6 HOURS PRN
Qty: 12 TABLET | Refills: 0 | Status: SHIPPED | OUTPATIENT
Start: 2024-06-02 | End: 2024-06-02

## 2024-06-02 RX ORDER — NALOXONE HYDROCHLORIDE 4 MG/.1ML
SPRAY NASAL
Qty: 2 EACH | Refills: 0 | Status: SHIPPED | OUTPATIENT
Start: 2024-06-02

## 2024-06-02 RX ORDER — IBUPROFEN 600 MG/1
600 TABLET ORAL EVERY 6 HOURS
Qty: 30 TABLET | Refills: 0 | Status: SHIPPED | OUTPATIENT
Start: 2024-06-02

## 2024-06-02 RX ADMIN — ESCITALOPRAM OXALATE 20 MG: 10 TABLET ORAL at 08:44

## 2024-06-02 RX ADMIN — OXYCODONE 10 MG: 5 TABLET ORAL at 10:39

## 2024-06-02 RX ADMIN — ACETAMINOPHEN 650 MG: 325 TABLET, FILM COATED ORAL at 06:26

## 2024-06-02 RX ADMIN — IBUPROFEN 600 MG: 600 TABLET, FILM COATED ORAL at 08:44

## 2024-06-02 RX ADMIN — IBUPROFEN 600 MG: 600 TABLET, FILM COATED ORAL at 02:55

## 2024-06-02 RX ADMIN — ACETAMINOPHEN 650 MG: 325 TABLET, FILM COATED ORAL at 00:31

## 2024-06-02 RX ADMIN — ACETAMINOPHEN 650 MG: 325 TABLET, FILM COATED ORAL at 12:05

## 2024-06-02 RX ADMIN — LABETALOL HYDROCHLORIDE 100 MG: 100 TABLET, FILM COATED ORAL at 08:43

## 2024-06-02 NOTE — PLAN OF CARE
Goal Outcome Evaluation:      Patient has been complaining of abdomen. Scheduled pain meds were given. Father of infant at beside.

## 2024-06-02 NOTE — DISCHARGE SUMMARY
[facility]  Delivery Discharge Summary    Primary OB Clinician: Luanne Streeter MD    Admission Diagnosis:  Principal Problem:    H/O  section complicating pregnancy  Active Problems:     delivery delivered      Discharge Diagnosis:  S/p RCS with bilateral salpingectomy    Gestational Age: 37w2d    Date of Delivery: 2024     Delivered By:  Luanne Streeter     Delivery Type: , Low Transverse      Intrapartum Course: Uncomplicated delivery.     Postpartum Course:  Patient's postpartum course has been uncomplicated.  Her bleeding is minimal, and her pain is controlled.  She is ambulating and tolerating regular diet.  She is bottle-feeding.  She is being discharged home today.  She will follow-up for her postpartum visit.      Physical Exam:    Vitals:   Vitals:    24 2115 24 2116 24 0040 24 0743   BP:  153/78 135/74 129/78   BP Location:  Right arm Right arm Right arm   Patient Position:  Lying Lying Sitting   Pulse:  85 93 91   Resp:   16 16   Temp:   98.5 °F (36.9 °C) 97.4 °F (36.3 °C)   TempSrc:   Oral Oral   SpO2:  97% 98% 99%   Weight: (!) 144 kg (316 lb 9.6 oz)      Height:         Temp (24hrs), Av.8 °F (36.6 °C), Min:97.4 °F (36.3 °C), Max:98.5 °F (36.9 °C)      General Appearance:    Alert, cooperative, in no acute distress   Abdomen:     Soft non-tender, non-distended, no guarding, no rebound         tenderness.   Extremities:   Moves all extremities well, no edema, no cyanosis, no             redness.   Incision:   C/D/I   Fundus:   Firm, below umbilicus     Feeding method: Breastfeeding Status: No    Blood Type: RH Positive      Plan:  Discharge to home.    Follow-up appointment in 5 days.

## 2024-06-02 NOTE — NURSING NOTE
Patient discharged home. Patient given written and verbal discharge instructions; verbalized understanding. Patient to follow up in 5 days for bp check. Perineal supplies given. Belongings sent with patient.  Discharged to lobby via wheelchair

## 2024-06-03 LAB
LAB AP CASE REPORT: NORMAL
LAB AP CASE REPORT: NORMAL
PATH REPORT.FINAL DX SPEC: NORMAL
PATH REPORT.FINAL DX SPEC: NORMAL
PATH REPORT.GROSS SPEC: NORMAL
PATH REPORT.GROSS SPEC: NORMAL

## 2024-06-11 ENCOUNTER — MATERNAL SCREENING (OUTPATIENT)
Dept: CALL CENTER | Facility: HOSPITAL | Age: 34
End: 2024-06-11
Payer: COMMERCIAL

## 2024-06-11 NOTE — OUTREACH NOTE
Maternal Screening Survey      Flowsheet Row Responses   Facility patient discharged from? [facility]   Attempt successful? Yes   Call start time 1344   Call end time 1348   EPD Scale: Able to Laugh 0-->as much as she always could   EPD Scale: Looked Forward 0-->as much as she ever did   EPD Scale: Blamed Self 1-->not very often   EPD Scale: Been Anxious 0-->no, not at all   EPD Scale: Felt Panicky 0-->no, not at all   EPD Scale: Things Getting on Top 0-->no, has been coping as well as ever   EPD Scale: Difficulty Sleeping 0-->no, not at all   EPD Scale: Sad or Miserable 0-->no, not at all   EPD Scale: Crying 0-->no, never   EPD Scale: Thought of Harming Self 0-->never   [place]  Depression Score 1   Did any of your parents have problems with alcohol or drug use? No   Do any of your peers have problems with alcohol or drug use? No   Does your partner have problems with alcohol or drug use? No   Before you were pregnant did you have problems with alcohol or drug use? (past) No   In the past month, did you drink beer, wine, liquor or use any other drugs? (pregnancy) No   Maternal Screening call completed Yes   Call end time 1348              LISY MCKAY - Registered Nurse

## 2024-08-05 RX ORDER — BUSPIRONE HYDROCHLORIDE 5 MG/1
5 TABLET ORAL 2 TIMES DAILY PRN
Qty: 60 TABLET | Refills: 0 | Status: SHIPPED | OUTPATIENT
Start: 2024-08-05

## 2024-08-05 RX ORDER — LABETALOL 100 MG/1
100 TABLET, FILM COATED ORAL EVERY 12 HOURS SCHEDULED
Qty: 180 TABLET | Refills: 0 | Status: SHIPPED | OUTPATIENT
Start: 2024-08-05

## 2024-08-05 RX ORDER — BUSPIRONE HYDROCHLORIDE 5 MG/1
5 TABLET ORAL 2 TIMES DAILY PRN
Qty: 60 TABLET | Refills: 0 | Status: CANCELLED | OUTPATIENT
Start: 2024-08-05

## 2024-08-19 NOTE — PROGRESS NOTES
Subjective     Alicia Ferrer is a 34 y.o. female.     History of Present Illness  Patient is here today for her annual CPE  Pt is a  at the [employer].  She is  with 2 children- 6yo son and 12wk old daughter  She has not been exercising.  She has not been eating a well balanced diet  Patient had a baby via  in May.  She had a tubule ligation at that time    Hypertension-patient is currently on labetalol 100 mg twice daily. She started meds during pregnancy. She has always been borderline. Denies CP, SOA, dizziness, HA. Would like to have just a daily med.      Anxiety-patient is currently on Lexapro 20 mg daily and buspar 5mg daily prn. She does take it everyday in the morning. She will sometimes take another dose. Doing well on medication. Denies any SI or HI. No side effects.     Psoriasis-patient currently has Kenalog 0.1% cream as needed.  She does not get flares often.  When she does they are mostly on her scalp. She will get one on her elbow as well.      Labs- due  Pap smear- UTD 22     Vaccines:  Flu- N/A  PNA- N/A  Tdap- 2019  Covid-19- UTD     Dental exam- UTD  Eye exam- UTD         The following portions of the patient's history were reviewed and updated as appropriate: allergies, current medications, past family history, past medical history, past social history, past surgical history, and problem list.    Review of Systems   Constitutional:  Negative for appetite change, chills, fatigue and fever.   HENT:  Negative for congestion, ear pain, hearing loss, postnasal drip, rhinorrhea, sinus pressure, sore throat, swollen glands and trouble swallowing.    Eyes:  Negative for blurred vision, double vision, pain, discharge, itching and visual disturbance.   Respiratory:  Negative for cough, chest tightness, shortness of breath and wheezing.    Cardiovascular:  Negative for chest pain and palpitations.   Gastrointestinal:  Negative for abdominal pain, blood in stool,  "constipation, diarrhea, nausea and vomiting.   Endocrine: Negative for polydipsia, polyphagia and polyuria.   Genitourinary:  Negative for breast lump, breast pain, dysuria, flank pain, frequency, urgency, vaginal bleeding, vaginal discharge and vaginal pain.   Musculoskeletal:  Negative for arthralgias, back pain and myalgias.   Skin:  Negative for rash and skin lesions.   Neurological:  Negative for dizziness, weakness, numbness and headache.   Psychiatric/Behavioral:  Negative for self-injury, suicidal ideas, depressed mood and stress. The patient is not nervous/anxious.        Objective     /71 (BP Location: Left arm, Patient Position: Sitting, Cuff Size: Large Adult)   Pulse 62   Temp 97.7 °F (36.5 °C) (Temporal)   Ht 170.2 cm (67\")   Wt (!) 139 kg (305 lb 9.6 oz)   SpO2 98%   Breastfeeding No   BMI 47.86 kg/m²     Current Outpatient Medications on File Prior to Visit   Medication Sig Dispense Refill    busPIRone (BUSPAR) 5 MG tablet Take 1 tablet by mouth 2 (Two) Times a Day As Needed for anxiety 60 tablet 0    escitalopram (Lexapro) 20 MG tablet Take 1 tablet by mouth Daily. 90 tablet 1    triamcinolone (KENALOG) 0.1 % cream Apply pea size amount topically to the affected area(s) three times daily as directed. 80 g 3    [DISCONTINUED] labetalol (NORMODYNE) 100 MG tablet 1 (one) tablet by mouth two times daily 180 tablet 0    [DISCONTINUED] cephalexin (KEFLEX) 500 MG capsule 1 (one) Capsule once daily (Patient not taking: Reported on 8/21/2024) 30 capsule 1     No current facility-administered medications on file prior to visit.        Class 3 Severe Obesity (BMI >=40). Obesity-related health conditions include the following: hypertension. Obesity is unchanged. BMI is is above average; BMI management plan is completed. We discussed portion control and increasing exercise.       Physical Exam  Vitals reviewed.   Constitutional:       General: She is not in acute distress.     Appearance: Normal " appearance. She is well-developed. She is not diaphoretic.   HENT:      Head: Normocephalic and atraumatic.      Right Ear: Tympanic membrane and ear canal normal.      Left Ear: Tympanic membrane and ear canal normal.      Nose: No congestion or rhinorrhea.      Mouth/Throat:      Pharynx: No oropharyngeal exudate or posterior oropharyngeal erythema.   Eyes:      General:         Right eye: No discharge.         Left eye: No discharge.      Extraocular Movements: Extraocular movements intact.      Conjunctiva/sclera: Conjunctivae normal.   Cardiovascular:      Rate and Rhythm: Normal rate and regular rhythm.      Heart sounds: No murmur heard.  Pulmonary:      Effort: Pulmonary effort is normal. No respiratory distress.      Breath sounds: Normal breath sounds. No wheezing or rales.   Abdominal:      General: Bowel sounds are normal.      Palpations: Abdomen is soft.   Musculoskeletal:         General: Normal range of motion.      Cervical back: Normal range of motion.   Skin:     General: Skin is warm and dry.   Neurological:      General: No focal deficit present.      Mental Status: She is alert and oriented to person, place, and time.   Psychiatric:         Mood and Affect: Mood normal.         Behavior: Behavior normal.         Thought Content: Thought content normal.         Judgment: Judgment normal.           Assessment & Plan     Diagnoses and all orders for this visit:    1. Preventative health care (Primary)  Comments:  work on diet and exercise  check labs  pap UTD  Orders:  -     Comprehensive Metabolic Panel; Future  -     Lipid Panel; Future    2. Anxiety  Comments:  stable  cont lexapro and buspar  denies SI or HI    3. Psoriasis  Comments:  stable  cont steroid cream    4. Hypertension, unspecified type  Comments:  stable  will wean off labetolol  start lisinopril  f/u 1 mo  Orders:  -     lisinopril (PRINIVIL,ZESTRIL) 10 MG tablet; Take 1 tablet by mouth Daily.  Dispense: 30 tablet; Refill:  0

## 2024-08-21 ENCOUNTER — OFFICE VISIT (OUTPATIENT)
Dept: FAMILY MEDICINE CLINIC | Facility: CLINIC | Age: 34
End: 2024-08-21
Payer: COMMERCIAL

## 2024-08-21 VITALS
BODY MASS INDEX: 45.99 KG/M2 | SYSTOLIC BLOOD PRESSURE: 137 MMHG | DIASTOLIC BLOOD PRESSURE: 71 MMHG | HEIGHT: 67 IN | TEMPERATURE: 97.7 F | WEIGHT: 293 LBS | OXYGEN SATURATION: 98 % | HEART RATE: 62 BPM

## 2024-08-21 DIAGNOSIS — L40.9 PSORIASIS: ICD-10-CM

## 2024-08-21 DIAGNOSIS — Z00.00 PREVENTATIVE HEALTH CARE: Primary | ICD-10-CM

## 2024-08-21 DIAGNOSIS — I10 HYPERTENSION, UNSPECIFIED TYPE: ICD-10-CM

## 2024-08-21 DIAGNOSIS — F41.9 ANXIETY: ICD-10-CM

## 2024-08-21 PROCEDURE — 99395 PREV VISIT EST AGE 18-39: CPT | Performed by: NURSE PRACTITIONER

## 2024-08-21 PROCEDURE — 99213 OFFICE O/P EST LOW 20 MIN: CPT | Performed by: NURSE PRACTITIONER

## 2024-08-21 RX ORDER — LISINOPRIL 10 MG/1
10 TABLET ORAL DAILY
Qty: 30 TABLET | Refills: 0 | Status: SHIPPED | OUTPATIENT
Start: 2024-08-21

## 2024-08-21 NOTE — PATIENT INSTRUCTIONS
Wean off labetolol- take 1 tab daily for 3 days then take 1/2 tab daily for 3 days then stop  Start lisinopril daily

## 2024-08-28 ENCOUNTER — LAB (OUTPATIENT)
Dept: LAB | Facility: HOSPITAL | Age: 34
End: 2024-08-28
Payer: COMMERCIAL

## 2024-08-28 DIAGNOSIS — Z00.00 PREVENTATIVE HEALTH CARE: ICD-10-CM

## 2024-08-28 LAB
ALBUMIN SERPL-MCNC: 4.4 G/DL (ref 3.5–5.2)
ALBUMIN/GLOB SERPL: 1.7 G/DL
ALP SERPL-CCNC: 70 U/L (ref 39–117)
ALT SERPL W P-5'-P-CCNC: 25 U/L (ref 1–33)
ANION GAP SERPL CALCULATED.3IONS-SCNC: 7.5 MMOL/L (ref 5–15)
AST SERPL-CCNC: 20 U/L (ref 1–32)
BILIRUB SERPL-MCNC: 0.5 MG/DL (ref 0–1.2)
BUN SERPL-MCNC: 10 MG/DL (ref 6–20)
BUN/CREAT SERPL: 11.1 (ref 7–25)
CALCIUM SPEC-SCNC: 9.2 MG/DL (ref 8.6–10.5)
CHLORIDE SERPL-SCNC: 104 MMOL/L (ref 98–107)
CHOLEST SERPL-MCNC: 178 MG/DL (ref 0–200)
CO2 SERPL-SCNC: 26.5 MMOL/L (ref 22–29)
CREAT SERPL-MCNC: 0.9 MG/DL (ref 0.57–1)
EGFRCR SERPLBLD CKD-EPI 2021: 85.1 ML/MIN/1.73
GLOBULIN UR ELPH-MCNC: 2.6 GM/DL
GLUCOSE SERPL-MCNC: 96 MG/DL (ref 65–99)
HDLC SERPL-MCNC: 48 MG/DL (ref 40–60)
LDLC SERPL CALC-MCNC: 107 MG/DL (ref 0–100)
LDLC/HDLC SERPL: 2.16 {RATIO}
POTASSIUM SERPL-SCNC: 4 MMOL/L (ref 3.5–5.2)
PROT SERPL-MCNC: 7 G/DL (ref 6–8.5)
SODIUM SERPL-SCNC: 138 MMOL/L (ref 136–145)
TRIGL SERPL-MCNC: 132 MG/DL (ref 0–150)
VLDLC SERPL-MCNC: 23 MG/DL (ref 5–40)

## 2024-08-28 PROCEDURE — 80061 LIPID PANEL: CPT

## 2024-08-28 PROCEDURE — 36415 COLL VENOUS BLD VENIPUNCTURE: CPT

## 2024-08-28 PROCEDURE — 80053 COMPREHEN METABOLIC PANEL: CPT

## 2024-09-15 DIAGNOSIS — I10 HYPERTENSION, UNSPECIFIED TYPE: ICD-10-CM

## 2024-09-15 RX ORDER — LISINOPRIL 10 MG/1
10 TABLET ORAL DAILY
Qty: 30 TABLET | Refills: 0 | Status: SHIPPED | OUTPATIENT
Start: 2024-09-15

## 2024-10-02 ENCOUNTER — OFFICE VISIT (OUTPATIENT)
Dept: FAMILY MEDICINE CLINIC | Facility: CLINIC | Age: 34
End: 2024-10-02
Payer: COMMERCIAL

## 2024-10-02 VITALS
BODY MASS INDEX: 48.08 KG/M2 | DIASTOLIC BLOOD PRESSURE: 84 MMHG | OXYGEN SATURATION: 98 % | WEIGHT: 293 LBS | HEART RATE: 58 BPM | SYSTOLIC BLOOD PRESSURE: 126 MMHG | TEMPERATURE: 98.4 F

## 2024-10-02 DIAGNOSIS — I10 HYPERTENSION, UNSPECIFIED TYPE: ICD-10-CM

## 2024-10-02 PROCEDURE — 99213 OFFICE O/P EST LOW 20 MIN: CPT | Performed by: NURSE PRACTITIONER

## 2024-10-02 RX ORDER — LISINOPRIL 10 MG/1
10 TABLET ORAL DAILY
Qty: 90 TABLET | Refills: 1 | Status: SHIPPED | OUTPATIENT
Start: 2024-10-02

## 2024-10-02 NOTE — PROGRESS NOTES
Subjective     Alicia Ferrer is a 34 y.o. female.     History of Present Illness  Patient is here today to follow-up on hypertension.  At her last visit she was weaned off of labetalol and started on lisinopril 10 mg daily.  She was originally started on the labetalol during pregnancy.  She states she is doing well on the lisinopril  She does not monitor her BP  She denies any side effects         The following portions of the patient's history were reviewed and updated as appropriate: allergies, current medications, past family history, past medical history, past social history, past surgical history, and problem list.    Review of Systems   Constitutional:  Negative for chills, fatigue and fever.   Respiratory:  Negative for chest tightness and shortness of breath.    Cardiovascular:  Negative for chest pain and palpitations.   Neurological:  Negative for dizziness and headache.       Objective     /84   Pulse 58   Temp 98.4 °F (36.9 °C) (Tympanic)   Wt (!) 139 kg (307 lb)   SpO2 98%   BMI 48.08 kg/m²     Current Outpatient Medications on File Prior to Visit   Medication Sig Dispense Refill    busPIRone (BUSPAR) 5 MG tablet Take 1 tablet by mouth 2 (Two) Times a Day As Needed for anxiety 60 tablet 0    escitalopram (Lexapro) 20 MG tablet Take 1 tablet by mouth Daily. 90 tablet 1    triamcinolone (KENALOG) 0.1 % cream Apply pea size amount topically to the affected area(s) three times daily as directed. 80 g 3    [DISCONTINUED] lisinopril (PRINIVIL,ZESTRIL) 10 MG tablet Take 1 tablet by mouth Daily. 30 tablet 0     No current facility-administered medications on file prior to visit.                 Physical Exam  Constitutional:       General: She is not in acute distress.     Appearance: Normal appearance. She is not ill-appearing.   HENT:      Head: Normocephalic and atraumatic.   Eyes:      Extraocular Movements: Extraocular movements intact.   Cardiovascular:      Rate and Rhythm: Normal rate and  regular rhythm.      Heart sounds: No murmur heard.  Pulmonary:      Effort: Pulmonary effort is normal. No respiratory distress.   Neurological:      General: No focal deficit present.      Mental Status: She is alert and oriented to person, place, and time.   Psychiatric:         Mood and Affect: Mood normal.         Behavior: Behavior normal.         Thought Content: Thought content normal.         Judgment: Judgment normal.           Assessment & Plan     Diagnoses and all orders for this visit:    1. Hypertension, unspecified type  Comments:  stable  cont lisinopril  monitor BP at home on occasion  Orders:  -     lisinopril (PRINIVIL,ZESTRIL) 10 MG tablet; Take 1 tablet by mouth Daily.  Dispense: 90 tablet; Refill: 1

## 2024-10-18 DIAGNOSIS — F41.9 ANXIETY: ICD-10-CM

## 2024-10-18 RX ORDER — ESCITALOPRAM OXALATE 20 MG/1
20 TABLET ORAL DAILY
Qty: 90 TABLET | Refills: 1 | Status: SHIPPED | OUTPATIENT
Start: 2024-10-18

## 2024-10-19 RX ORDER — BUSPIRONE HYDROCHLORIDE 5 MG/1
5 TABLET ORAL 2 TIMES DAILY PRN
Qty: 60 TABLET | Refills: 0 | Status: CANCELLED | OUTPATIENT
Start: 2024-10-19

## 2024-10-21 RX ORDER — BUSPIRONE HYDROCHLORIDE 5 MG/1
5 TABLET ORAL 2 TIMES DAILY PRN
Qty: 60 TABLET | Refills: 0 | Status: SHIPPED | OUTPATIENT
Start: 2024-10-21

## 2024-11-28 ENCOUNTER — HOSPITAL ENCOUNTER (INPATIENT)
Facility: HOSPITAL | Age: 34
LOS: 2 days | Discharge: HOME | DRG: 603 | End: 2024-11-30
Attending: EMERGENCY MEDICINE | Admitting: INTERNAL MEDICINE
Payer: COMMERCIAL

## 2024-11-28 ENCOUNTER — APPOINTMENT (OUTPATIENT)
Dept: CT IMAGING | Facility: HOSPITAL | Age: 34
DRG: 603 | End: 2024-11-28
Payer: COMMERCIAL

## 2024-11-28 DIAGNOSIS — L03.115 CELLULITIS OF RIGHT LOWER EXTREMITY: Primary | ICD-10-CM

## 2024-11-28 LAB
ALBUMIN SERPL-MCNC: 3.6 G/DL (ref 3.5–5.2)
ALBUMIN/GLOB SERPL: 1.3 G/DL
ALP SERPL-CCNC: 67 U/L (ref 39–117)
ALT SERPL W P-5'-P-CCNC: 24 U/L (ref 1–33)
ANION GAP SERPL CALCULATED.3IONS-SCNC: 7.6 MMOL/L (ref 5–15)
AST SERPL-CCNC: 20 U/L (ref 1–32)
BASOPHILS # BLD AUTO: 0.04 10*3/MM3 (ref 0–0.2)
BASOPHILS NFR BLD AUTO: 0.3 % (ref 0–1.5)
BILIRUB SERPL-MCNC: 0.4 MG/DL (ref 0–1.2)
BUN SERPL-MCNC: 11 MG/DL (ref 6–20)
BUN/CREAT SERPL: 13.3 (ref 7–25)
CALCIUM SPEC-SCNC: 8.6 MG/DL (ref 8.6–10.5)
CHLORIDE SERPL-SCNC: 101 MMOL/L (ref 98–107)
CO2 SERPL-SCNC: 27.4 MMOL/L (ref 22–29)
CREAT SERPL-MCNC: 0.83 MG/DL (ref 0.57–1)
DEPRECATED RDW RBC AUTO: 39.7 FL (ref 37–54)
EGFRCR SERPLBLD CKD-EPI 2021: 93.8 ML/MIN/1.73
EOSINOPHIL # BLD AUTO: 0.17 10*3/MM3 (ref 0–0.4)
EOSINOPHIL NFR BLD AUTO: 1.2 % (ref 0.3–6.2)
ERYTHROCYTE [DISTWIDTH] IN BLOOD BY AUTOMATED COUNT: 13 % (ref 12.3–15.4)
GLOBULIN UR ELPH-MCNC: 2.7 GM/DL
GLUCOSE SERPL-MCNC: 90 MG/DL (ref 65–99)
HCT VFR BLD AUTO: 34.8 % (ref 34–46.6)
HGB BLD-MCNC: 11 G/DL (ref 12–15.9)
IMM GRANULOCYTES # BLD AUTO: 0.03 10*3/MM3 (ref 0–0.05)
IMM GRANULOCYTES NFR BLD AUTO: 0.2 % (ref 0–0.5)
LYMPHOCYTES # BLD AUTO: 3.51 10*3/MM3 (ref 0.7–3.1)
LYMPHOCYTES NFR BLD AUTO: 25 % (ref 19.6–45.3)
MCH RBC QN AUTO: 26 PG (ref 26.6–33)
MCHC RBC AUTO-ENTMCNC: 31.6 G/DL (ref 31.5–35.7)
MCV RBC AUTO: 82.3 FL (ref 79–97)
MONOCYTES # BLD AUTO: 1.03 10*3/MM3 (ref 0.1–0.9)
MONOCYTES NFR BLD AUTO: 7.3 % (ref 5–12)
NEUTROPHILS NFR BLD AUTO: 66 % (ref 42.7–76)
NEUTROPHILS NFR BLD AUTO: 9.25 10*3/MM3 (ref 1.7–7)
PLATELET # BLD AUTO: 252 10*3/MM3 (ref 140–450)
PMV BLD AUTO: 9.4 FL (ref 6–12)
POTASSIUM SERPL-SCNC: 3.8 MMOL/L (ref 3.5–5.2)
PROT SERPL-MCNC: 6.3 G/DL (ref 6–8.5)
RBC # BLD AUTO: 4.23 10*6/MM3 (ref 3.77–5.28)
SODIUM SERPL-SCNC: 136 MMOL/L (ref 136–145)
WBC NRBC COR # BLD AUTO: 14.03 10*3/MM3 (ref 3.4–10.8)

## 2024-11-28 PROCEDURE — 99285 EMERGENCY DEPT VISIT HI MDM: CPT

## 2024-11-28 PROCEDURE — 25010000002 CEFTRIAXONE PER 250 MG: Performed by: NURSE PRACTITIONER

## 2024-11-28 PROCEDURE — 73701 CT LOWER EXTREMITY W/DYE: CPT

## 2024-11-28 PROCEDURE — 25010000002 VANCOMYCIN 2-0.9 GM/500ML-% SOLUTION: Performed by: NURSE PRACTITIONER

## 2024-11-28 PROCEDURE — 99283 EMERGENCY DEPT VISIT LOW MDM: CPT | Performed by: NURSE PRACTITIONER

## 2024-11-28 PROCEDURE — 85025 COMPLETE CBC W/AUTO DIFF WBC: CPT | Performed by: NURSE PRACTITIONER

## 2024-11-28 PROCEDURE — 87186 SC STD MICRODIL/AGAR DIL: CPT | Performed by: INTERNAL MEDICINE

## 2024-11-28 PROCEDURE — 25810000003 SODIUM CHLORIDE 0.9 % SOLUTION: Performed by: NURSE PRACTITIONER

## 2024-11-28 PROCEDURE — 87070 CULTURE OTHR SPECIMN AEROBIC: CPT | Performed by: INTERNAL MEDICINE

## 2024-11-28 PROCEDURE — 87147 CULTURE TYPE IMMUNOLOGIC: CPT | Performed by: INTERNAL MEDICINE

## 2024-11-28 PROCEDURE — 25510000001 IOPAMIDOL PER 1 ML: Performed by: EMERGENCY MEDICINE

## 2024-11-28 PROCEDURE — 87205 SMEAR GRAM STAIN: CPT | Performed by: INTERNAL MEDICINE

## 2024-11-28 PROCEDURE — 80053 COMPREHEN METABOLIC PANEL: CPT | Performed by: NURSE PRACTITIONER

## 2024-11-28 RX ORDER — ONDANSETRON 2 MG/ML
4 INJECTION INTRAMUSCULAR; INTRAVENOUS EVERY 6 HOURS PRN
Status: DISCONTINUED | OUTPATIENT
Start: 2024-11-28 | End: 2024-11-30 | Stop reason: HOSPADM

## 2024-11-28 RX ORDER — ACETAMINOPHEN 160 MG/5ML
650 SOLUTION ORAL EVERY 4 HOURS PRN
Status: DISCONTINUED | OUTPATIENT
Start: 2024-11-28 | End: 2024-11-30 | Stop reason: HOSPADM

## 2024-11-28 RX ORDER — SODIUM CHLORIDE 0.9 % (FLUSH) 0.9 %
10 SYRINGE (ML) INJECTION AS NEEDED
Status: DISCONTINUED | OUTPATIENT
Start: 2024-11-28 | End: 2024-11-30 | Stop reason: HOSPADM

## 2024-11-28 RX ORDER — POLYETHYLENE GLYCOL 3350 17 G/17G
17 POWDER, FOR SOLUTION ORAL DAILY PRN
Status: DISCONTINUED | OUTPATIENT
Start: 2024-11-28 | End: 2024-11-30 | Stop reason: HOSPADM

## 2024-11-28 RX ORDER — SODIUM CHLORIDE 0.9 % (FLUSH) 0.9 %
10 SYRINGE (ML) INJECTION EVERY 12 HOURS SCHEDULED
Status: DISCONTINUED | OUTPATIENT
Start: 2024-11-28 | End: 2024-11-30 | Stop reason: HOSPADM

## 2024-11-28 RX ORDER — NALOXONE HCL 0.4 MG/ML
0.4 VIAL (ML) INJECTION
Status: DISCONTINUED | OUTPATIENT
Start: 2024-11-28 | End: 2024-11-30 | Stop reason: HOSPADM

## 2024-11-28 RX ORDER — VANCOMYCIN 2 GRAM/500 ML IN 0.9 % SODIUM CHLORIDE INTRAVENOUS
2000 ONCE
Status: COMPLETED | OUTPATIENT
Start: 2024-11-28 | End: 2024-11-28

## 2024-11-28 RX ORDER — HYDROCODONE BITARTRATE AND ACETAMINOPHEN 5; 325 MG/1; MG/1
1 TABLET ORAL EVERY 6 HOURS PRN
Status: DISCONTINUED | OUTPATIENT
Start: 2024-11-28 | End: 2024-11-29

## 2024-11-28 RX ORDER — BISACODYL 10 MG
10 SUPPOSITORY, RECTAL RECTAL DAILY PRN
Status: DISCONTINUED | OUTPATIENT
Start: 2024-11-28 | End: 2024-11-30 | Stop reason: HOSPADM

## 2024-11-28 RX ORDER — ACETAMINOPHEN 325 MG/1
650 TABLET ORAL EVERY 4 HOURS PRN
Status: DISCONTINUED | OUTPATIENT
Start: 2024-11-28 | End: 2024-11-30 | Stop reason: HOSPADM

## 2024-11-28 RX ORDER — IOPAMIDOL 755 MG/ML
100 INJECTION, SOLUTION INTRAVASCULAR
Status: COMPLETED | OUTPATIENT
Start: 2024-11-28 | End: 2024-11-28

## 2024-11-28 RX ORDER — BUSPIRONE HYDROCHLORIDE 5 MG/1
5 TABLET ORAL 2 TIMES DAILY PRN
Status: DISCONTINUED | OUTPATIENT
Start: 2024-11-28 | End: 2024-11-30 | Stop reason: HOSPADM

## 2024-11-28 RX ORDER — ESCITALOPRAM OXALATE 10 MG/1
20 TABLET ORAL DAILY
Status: DISCONTINUED | OUTPATIENT
Start: 2024-11-28 | End: 2024-11-30 | Stop reason: HOSPADM

## 2024-11-28 RX ORDER — AMOXICILLIN 250 MG
2 CAPSULE ORAL 2 TIMES DAILY PRN
Status: DISCONTINUED | OUTPATIENT
Start: 2024-11-28 | End: 2024-11-30 | Stop reason: HOSPADM

## 2024-11-28 RX ORDER — BISACODYL 5 MG/1
5 TABLET, DELAYED RELEASE ORAL DAILY PRN
Status: DISCONTINUED | OUTPATIENT
Start: 2024-11-28 | End: 2024-11-30 | Stop reason: HOSPADM

## 2024-11-28 RX ORDER — MORPHINE SULFATE 2 MG/ML
2 INJECTION, SOLUTION INTRAMUSCULAR; INTRAVENOUS EVERY 4 HOURS PRN
Status: DISCONTINUED | OUTPATIENT
Start: 2024-11-28 | End: 2024-11-30 | Stop reason: HOSPADM

## 2024-11-28 RX ORDER — ACETAMINOPHEN 650 MG/1
650 SUPPOSITORY RECTAL EVERY 4 HOURS PRN
Status: DISCONTINUED | OUTPATIENT
Start: 2024-11-28 | End: 2024-11-30 | Stop reason: HOSPADM

## 2024-11-28 RX ORDER — LISINOPRIL 5 MG/1
10 TABLET ORAL DAILY
Status: DISCONTINUED | OUTPATIENT
Start: 2024-11-29 | End: 2024-11-30 | Stop reason: HOSPADM

## 2024-11-28 RX ORDER — SODIUM CHLORIDE 9 MG/ML
40 INJECTION, SOLUTION INTRAVENOUS AS NEEDED
Status: DISCONTINUED | OUTPATIENT
Start: 2024-11-28 | End: 2024-11-30 | Stop reason: HOSPADM

## 2024-11-28 RX ADMIN — ACETAMINOPHEN 650 MG: 325 TABLET, FILM COATED ORAL at 20:45

## 2024-11-28 RX ADMIN — IOPAMIDOL 100 ML: 755 INJECTION, SOLUTION INTRAVENOUS at 14:40

## 2024-11-28 RX ADMIN — Medication 10 ML: at 20:39

## 2024-11-28 RX ADMIN — Medication 2000 MG: at 20:26

## 2024-11-28 RX ADMIN — CEFTRIAXONE 1000 MG: 1 INJECTION, POWDER, FOR SOLUTION INTRAMUSCULAR; INTRAVENOUS at 15:41

## 2024-11-28 RX ADMIN — SODIUM CHLORIDE 1000 ML: 9 INJECTION, SOLUTION INTRAVENOUS at 14:37

## 2024-11-28 NOTE — PLAN OF CARE
Goal Outcome Evaluation:              Outcome Evaluation: pt admit from free standing ED with large area of erythema surrounding open area on upper thigh.

## 2024-11-28 NOTE — H&P
[facility]  History & Physical    Patient Name: Alicia Ferrer  : 1990  MRN: [id number]  Primary Care Physician:  Alicia Lawton APRN  Date of admission: 2024  Date and Time of Service: 2024 at 1800    Subjective      Chief Complaint: Right thigh wound with erythema and pain    History of Present Illness: Alicia Ferrer is a 34 y.o. female with a CMH of hypertension who presents to the hospital with complaints of right thigh wound with surrounding erythema and pain.  The patient states she first noticed a pimple like lesion on her right inner thigh several days prior to admission. She attempted to squeeze it to open it up and was able to extract a small amount of purulent material. She went to the pharmacy and obtained some dressings to further assist in draining the lesion, however it continued to get larger and more erythematous surrounding the lesion. She did have some chills and subjective fever 2 days prior to admission. She also has noticed significant erythema surrounding the lesion.       Review of Systems  10 Point ROS negative except for above.     Personal History     Past Medical History:   Diagnosis Date    Anxiety     Just now put on med little more than yr ago    Gestational diabetes     Gestational hypertension     Hypertension     Migraine     Psoriasis     scalp and abdomen, elbows    Tremor     benign-- Dr. Seipel diagnosed    Urinary tract infection     recurrent    Varicella        Past Surgical History:   Procedure Laterality Date     SECTION  2019     SECTION WITH TUBAL N/A 2024    Procedure:  SECTION REPEAT WITH TUBAL;  Surgeon: Luanne Streeter MD;  Location: [facility] LABOR DELIVERY;  Service: Obstetrics;  Laterality: N/A;    EYE SURGERY  2006    Lasik    KNEE SURGERY      OTHER SURGICAL HISTORY      birth hector removal-head    TONSILLECTOMY AND ADENOIDECTOMY  2006    WISDOM TOOTH  EXTRACTION         Family History: family history includes Cancer in her maternal grandfather; Depression in her sister; Heart disease in her maternal grandmother, paternal grandfather, and paternal grandmother; Hyperlipidemia in her mother, sister, and sister; Hypertension in her father, mother, and sister; Lung cancer in her maternal grandfather; Miscarriages / Stillbirths in her sister; Stroke in her paternal grandmother. Otherwise pertinent FHx was reviewed and not pertinent to current issue.    Social History:  reports that she quit smoking about 17 years ago. Her smoking use included cigarettes. She started smoking about 12 years ago. She has a 6.5 pack-year smoking history. She has never used smokeless tobacco. She reports that she does not currently use alcohol. She reports that she does not use drugs.    Home Medications:  Prior to Admission Medications       Prescriptions Last Dose Informant Patient Reported? Taking?    busPIRone (BUSPAR) 5 MG tablet   No No    Take 1 tablet by mouth 2 (Two) Times a Day As Needed for anxiety    escitalopram (Lexapro) 20 MG tablet   No No    Take 1 tablet by mouth Daily.    lisinopril (PRINIVIL,ZESTRIL) 10 MG tablet   No No    Take 1 tablet by mouth Daily.    triamcinolone (KENALOG) 0.1 % cream   No No    Apply pea size amount topically to the affected area(s) three times daily as directed.              Allergies:  No Known Allergies    Objective      Vitals:   Temp:  [97.8 °F (36.6 °C)-98.2 °F (36.8 °C)] 97.8 °F (36.6 °C)  Heart Rate:  [61-83] 83  Resp:  [16-20] 16  BP: (126-152)/(53-80) 142/80  Body mass index is 48.26 kg/m².  Physical Exam  General Appearance:  Alert, cooperative, no distress, appears stated age  Head:  Normocephalic, without obvious abnormality, atraumatic  Eyes:  PERRL, conjunctiva/corneas clear, EOM's intact, fundi benign, both eyes  Ears:  Normal TM's and external ear canals, both ears  Nose: Nares normal, septum midline, mucosa normal, no drainage  or sinus tenderness  Throat: Lips, mucosa, and tongue normal; teeth and gums normal  Neck: Supple, symmetrical, trachea midline, no adenopathy, thyroid: not enlarged, symmetric, no tenderness/mass/nodules, no carotid bruit or JVD  Lungs:   Clear to auscultation bilaterally, respirations unlabored  Heart:  Regular rate and rhythm, S1, S2 normal, no murmur, rub or gallop  Abdomen:  Soft, non-tender, bowel sounds active all four quadrants,  no masses, no organomegaly  Extremities: Right inner thigh wound with surrounding induration measuring 6 x 6 cm with large surrounding area of erythema, with mild purulent drainage, atraumatic, no cyanosis or edema  Pulses: 2+ and symmetric  Skin: Skin color, texture, turgor normal, no rashes or lesions  Neurologic: Normal      Diagnostic Data:  Lab Results (last 24 hours)       Procedure Component Value Units Date/Time    Comprehensive Metabolic Panel [[id number]] Collected: 11/28/24 1436    Specimen: Blood Updated: 11/28/24 1512     Glucose 90 mg/dL      BUN 11 mg/dL      Creatinine 0.83 mg/dL      Sodium 136 mmol/L      Potassium 3.8 mmol/L      Chloride 101 mmol/L      CO2 27.4 mmol/L      Calcium 8.6 mg/dL      Total Protein 6.3 g/dL      Albumin 3.6 g/dL      ALT (SGPT) 24 U/L      AST (SGOT) 20 U/L      Alkaline Phosphatase 67 U/L      Total Bilirubin 0.4 mg/dL      Globulin 2.7 gm/dL      A/G Ratio 1.3 g/dL      BUN/Creatinine Ratio 13.3     Anion Gap 7.6 mmol/L      eGFR 93.8 mL/min/1.73     Narrative:      GFR Normal >60  Chronic Kidney Disease <60  Kidney Failure <15      CBC & Differential [[id number]]  (Abnormal) Collected: 11/28/24 1436    Specimen: Blood Updated: 11/28/24 1450    Narrative:      The following orders were created for panel order CBC & Differential.  Procedure                               Abnormality         Status                     ---------                               -----------         ------                     CBC Auto Differential[[id number]]         Abnormal            Final result                 Please view results for these tests on the individual orders.    CBC Auto Differential [[id number]]  (Abnormal) Collected: 11/28/24 1436    Specimen: Blood Updated: 11/28/24 1450     WBC 14.03 10*3/mm3      RBC 4.23 10*6/mm3      Hemoglobin 11.0 g/dL      Hematocrit 34.8 %      MCV 82.3 fL      MCH 26.0 pg      MCHC 31.6 g/dL      RDW 13.0 %      RDW-SD 39.7 fl      MPV 9.4 fL      Platelets 252 10*3/mm3      Neutrophil % 66.0 %      Lymphocyte % 25.0 %      Monocyte % 7.3 %      Eosinophil % 1.2 %      Basophil % 0.3 %      Immature Grans % 0.2 %      Neutrophils, Absolute 9.25 10*3/mm3      Lymphocytes, Absolute 3.51 10*3/mm3      Monocytes, Absolute 1.03 10*3/mm3      Eosinophils, Absolute 0.17 10*3/mm3      Basophils, Absolute 0.04 10*3/mm3      Immature Grans, Absolute 0.03 10*3/mm3              Imaging Results (Last 24 Hours)       Procedure Component Value Units Date/Time    CT Lower Extremity Right With Contrast [[id number]] Collected: 11/28/24 1450     Updated: 11/28/24 1457    Narrative:      CT LOWER EXTREMITY RIGHT W CONTRAST    Date of Exam: 11/28/2024 2:23 PM EST    Indication: possible asbcess to the right inner thigh, large area of redness, drainage,  erythema.    Comparison: None available.    Technique: Axial CT images were obtained of the right lower extremity after the uneventful intravenous administration of iodinated contrast.  Sagittal and coronal reconstructions were performed.  Automated exposure control and iterative reconstruction   methods were used.      Findings:  There is edema noted in the subcutaneous soft tissues along the anteromedial aspect of the proximal to mid right thigh. Associated skin thickening is noted the findings suggest changes of soft tissue cellulitis. No well-defined fluid collection is seen   to suggest abscess.    There is no evidence for significant edema within the underlying deep muscular soft tissues. There  is no gas production throughout the soft tissues to indicate signs of necrosis.    No osseous erosion or destruction is seen. There is no evidence for osteomyelitis. There is no periostitis.      Impression:      Impression:  1.Evidence for soft tissue cellulitis involving the medial aspect of the right thigh. There is no evidence for abscess.  2.No evidence for osteomyelitis.        Electronically Signed: Andres Del Toro MD    11/28/2024 2:55 PM EST    Workstation ID: [id number]              Assessment & Plan        This is a 34 y.o. female with:    Active and Resolved Problems  Active Hospital Problems    Diagnosis  POA    **Cellulitis of right thigh [L03.115]  Yes      Resolved Hospital Problems   No resolved problems to display.       Right thigh cellulitis with abscess  -Patient presents with a fairly sizable wound with induration and erythema of the right thigh  -CT of the lower extremity did not show any abscess however there was a large area of induration surrounding the purulent draining wound  -Initiated on IV antibiotics with vancomycin, ceftriaxone  -General Surgery consult; patient may require further I&D of the wound  -Check wound and blood cultures and de-escalate antibiotic therapy based on final culture results    Hypertension  -Resume home lisinopril dose    Morbid Obesity  -Counseled patient on diet and exercise to improve weight loss and comorbid conditions    VTE Prophylaxis:  Mechanical VTE prophylaxis orders are present.        The patient desires to be as follows:    CODE STATUS:               Admission Status:  I believe this patient meets inpatient status.    Expected Length of Stay: Greater than 2 midnights    PDMP and Medication Dispenses via Sidebar reviewed and consistent with patient reported medications.    I discussed the patient's findings and my recommendations with patient.      Signature:     This document has been electronically signed by Checo Shah MD on November 28, 2024  18:04 NERISSA Sharpe Hospitalist Team

## 2024-11-28 NOTE — ED NOTES
Pt left department to go to [facility]; admitted to room 4106. IV in place, wrapped with coban. Pt and spouse advised where to go at [facility], paperwork and directions provided. Advised to not infuse or inject anything into IV. Verbalized understanding.

## 2024-11-28 NOTE — FSED PROVIDER NOTE
Subjective   History of Present Illness  The patient is a 34-year-old female who presents to the ER with possible abscess to the right inner thigh.  Patient reports that she noticed this 3 days ago at home.  Patient reports fever and chills 2 nights ago.    History provided by:  Patient   used: No        Review of Systems   Skin:  Positive for wound.        Patient with erythema, firmness to inner right thigh,.        Past Medical History:   Diagnosis Date    Anxiety     Just now put on med little more than yr ago    Gestational diabetes     Gestational hypertension     Hypertension     Migraine     Psoriasis     scalp and abdomen, elbows    Tremor     benign-- Dr. Seipel diagnosed    Urinary tract infection     recurrent    Varicella        No Known Allergies    Past Surgical History:   Procedure Laterality Date     SECTION  2019     SECTION WITH TUBAL N/A 2024    Procedure:  SECTION REPEAT WITH TUBAL;  Surgeon: Luanne Streeter MD;  Location: [facility] LABOR DELIVERY;  Service: Obstetrics;  Laterality: N/A;    EYE SURGERY      Lasik    KNEE SURGERY  2003    OTHER SURGICAL HISTORY      birth hector removal-head    TONSILLECTOMY AND ADENOIDECTOMY  2006    WISDOM TOOTH EXTRACTION         Family History   Problem Relation Age of Onset    Hypertension Father     Hypertension Mother     Hyperlipidemia Mother     Hypertension Sister     Depression Sister     Hyperlipidemia Sister     Miscarriages / Stillbirths Sister     Hyperlipidemia Sister     Heart disease Paternal Grandfather     Stroke Paternal Grandmother     Heart disease Paternal Grandmother     Heart disease Maternal Grandmother     Cancer Maternal Grandfather     Lung cancer Maternal Grandfather        Social History     Socioeconomic History    Marital status:    Tobacco Use    Smoking status: Former     Current packs/day: 0.50     Average packs/day: 0.5 packs/day for 12.9 years  (6.5 ttl pk-yrs)     Types: Cigarettes     Start date: 2012     Quit date: 2007    Smokeless tobacco: Never   Vaping Use    Vaping status: Never Used   Substance and Sexual Activity    Alcohol use: Not Currently     Comment: Only on occasion    Drug use: Never    Sexual activity: Yes     Partners: Male     Birth control/protection: None           Objective   Physical Exam  Vitals and nursing note reviewed.   Constitutional:       Appearance: Normal appearance.   HENT:      Head: Normocephalic.      Right Ear: Tympanic membrane and ear canal normal.      Left Ear: Tympanic membrane and ear canal normal.      Nose: Nose normal.      Mouth/Throat:      Mouth: Mucous membranes are moist.      Pharynx: Oropharynx is clear.   Eyes:      Conjunctiva/sclera: Conjunctivae normal.   Pulmonary:      Effort: Pulmonary effort is normal.      Breath sounds: Normal breath sounds.   Musculoskeletal:         General: Normal range of motion.      Cervical back: Full passive range of motion without pain, normal range of motion and neck supple.   Skin:     General: Skin is warm and dry.      Findings: Erythema and wound present.             Comments: Patient with area of redness to right inner thigh that measures approximately 17 cm x 17 cm.   Neurological:      General: No focal deficit present.      Mental Status: She is alert and oriented to person, place, and time.   Psychiatric:         Mood and Affect: Mood normal.         Behavior: Behavior normal. Behavior is cooperative.           Procedures           ED Course  ED Course as of 11/28/24 1611   Thu Nov 28, 2024   1456 WBC(!): 14.03 [DS]   1456 Hemoglobin(!): 11.0 [DS]   1456 Hematocrit: 34.8 [DS]   1506 CT LOWER EXTREMITY RIGHT W CONTRAST     Date of Exam: 11/28/2024 2:23 PM EST     Indication: possible asbcess to the right inner thigh, large area of redness, drainage,  erythema.     Comparison: None available.     Technique: Axial CT images were obtained of the right lower  extremity after the uneventful intravenous administration of iodinated contrast.  Sagittal and coronal reconstructions were performed.  Automated exposure control and iterative reconstruction   methods were used.        Findings:  There is edema noted in the subcutaneous soft tissues along the anteromedial aspect of the proximal to mid right thigh. Associated skin thickening is noted the findings suggest changes of soft tissue cellulitis. No well-defined fluid collection is seen   to suggest abscess.     There is no evidence for significant edema within the underlying deep muscular soft tissues. There is no gas production throughout the soft tissues to indicate signs of necrosis.     No osseous erosion or destruction is seen. There is no evidence for osteomyelitis. There is no periostitis.     IMPRESSION:  Impression:  1.Evidence for soft tissue cellulitis involving the medial aspect of the right thigh. There is no evidence for abscess.  2.No evidence for osteomyelitis.      [DS]   1512 Glucose: 90 [DS]   1513 BUN: 11 [DS]   1513 Creatinine: 0.83 [DS]   1513 Sodium: 136 [DS]   1513 Potassium: 3.8 [DS]   1531 Call placed to hospitalist for admission.  [DS]   1536 Spoke with Dr. Shah, will accept for admission.  [DS]      ED Course User Index  [DS] Leigh Aguilar APRN                                           Medical Decision Making  The patient is a 34-year-old female who presents to the ER with possible abscess to the right inner thigh.  Patient reports that she noticed this 3 days ago at home.  Patient reports fever and chills 2 nights ago.    This patient presents with initial presentation of local erythema, warmth, swelling concerning for cellulitis of the right inner thigh that measures approximately 17cm x 17 cm. Sensitivity/pain to light touch around the erythematous area. No lymphangitic spread visible and no fluid pockets or fluctuance concerning for abscess noted. Low concern for osteomyelitis or  DVT. No immune compromise, bullae, pain out of proportion, or rapid progression concerning for necrotizing fasciitis. Patient with elevated WBC of 14.03, CT shows cellulitis, no abscess noted.     See photo of area.     Problems Addressed:  Cellulitis of right lower extremity: complicated acute illness or injury    Amount and/or Complexity of Data Reviewed  Labs: ordered. Decision-making details documented in ED Course.  Radiology: ordered. Decision-making details documented in ED Course.  Discussion of management or test interpretation with external provider(s): Spoke with Dr. Shah, hospitalist, accepts for admission.     Risk  Decision regarding hospitalization.        Final diagnoses:   Cellulitis of right lower extremity       ED Disposition  ED Disposition       ED Disposition   Decision to Admit    Condition   --    Comment   Level of Care: Med/Surg [1]   Admitting Physician: JOHN SHAH [[id number]]   Attending Physician: JOHN SHAH [[id number]]                 No follow-up provider specified.       Medication List      No changes were made to your prescriptions during this visit.

## 2024-11-29 LAB
ANION GAP SERPL CALCULATED.3IONS-SCNC: 8.5 MMOL/L (ref 5–15)
BASOPHILS # BLD AUTO: 0.04 10*3/MM3 (ref 0–0.2)
BASOPHILS NFR BLD AUTO: 0.3 % (ref 0–1.5)
BUN SERPL-MCNC: 10 MG/DL (ref 6–20)
BUN/CREAT SERPL: 13.2 (ref 7–25)
CALCIUM SPEC-SCNC: 8.6 MG/DL (ref 8.6–10.5)
CHLORIDE SERPL-SCNC: 106 MMOL/L (ref 98–107)
CO2 SERPL-SCNC: 24.5 MMOL/L (ref 22–29)
CREAT SERPL-MCNC: 0.76 MG/DL (ref 0.57–1)
DEPRECATED RDW RBC AUTO: 39.2 FL (ref 37–54)
EGFRCR SERPLBLD CKD-EPI 2021: 104.3 ML/MIN/1.73
EOSINOPHIL # BLD AUTO: 0.22 10*3/MM3 (ref 0–0.4)
EOSINOPHIL NFR BLD AUTO: 1.8 % (ref 0.3–6.2)
ERYTHROCYTE [DISTWIDTH] IN BLOOD BY AUTOMATED COUNT: 13 % (ref 12.3–15.4)
GLUCOSE SERPL-MCNC: 107 MG/DL (ref 65–99)
HBV SURFACE AG SERPL QL IA: NORMAL
HCT VFR BLD AUTO: 35 % (ref 34–46.6)
HCV AB SER QL: NORMAL
HGB BLD-MCNC: 11 G/DL (ref 12–15.9)
HIV 1+2 AB+HIV1 P24 AG SERPL QL IA: NORMAL
IMM GRANULOCYTES # BLD AUTO: 0.05 10*3/MM3 (ref 0–0.05)
IMM GRANULOCYTES NFR BLD AUTO: 0.4 % (ref 0–0.5)
LYMPHOCYTES # BLD AUTO: 3.27 10*3/MM3 (ref 0.7–3.1)
LYMPHOCYTES NFR BLD AUTO: 27 % (ref 19.6–45.3)
MAGNESIUM SERPL-MCNC: 2.3 MG/DL (ref 1.6–2.6)
MCH RBC QN AUTO: 26.1 PG (ref 26.6–33)
MCHC RBC AUTO-ENTMCNC: 31.4 G/DL (ref 31.5–35.7)
MCV RBC AUTO: 82.9 FL (ref 79–97)
MONOCYTES # BLD AUTO: 0.98 10*3/MM3 (ref 0.1–0.9)
MONOCYTES NFR BLD AUTO: 8.1 % (ref 5–12)
NEUTROPHILS NFR BLD AUTO: 62.4 % (ref 42.7–76)
NEUTROPHILS NFR BLD AUTO: 7.53 10*3/MM3 (ref 1.7–7)
NRBC BLD AUTO-RTO: 0 /100 WBC (ref 0–0.2)
PLATELET # BLD AUTO: 260 10*3/MM3 (ref 140–450)
PMV BLD AUTO: 9.7 FL (ref 6–12)
POTASSIUM SERPL-SCNC: 3.4 MMOL/L (ref 3.5–5.2)
RBC # BLD AUTO: 4.22 10*6/MM3 (ref 3.77–5.28)
SODIUM SERPL-SCNC: 139 MMOL/L (ref 136–145)
VANCOMYCIN SERPL-MCNC: 7.4 MCG/ML (ref 5–40)
WBC NRBC COR # BLD AUTO: 12.09 10*3/MM3 (ref 3.4–10.8)

## 2024-11-29 PROCEDURE — 85025 COMPLETE CBC W/AUTO DIFF WBC: CPT | Performed by: INTERNAL MEDICINE

## 2024-11-29 PROCEDURE — 25010000002 VANCOMYCIN HCL 1.25 G RECONSTITUTED SOLUTION 1 EACH VIAL: Performed by: INTERNAL MEDICINE

## 2024-11-29 PROCEDURE — 80202 ASSAY OF VANCOMYCIN: CPT | Performed by: INTERNAL MEDICINE

## 2024-11-29 PROCEDURE — 25010000002 ONDANSETRON PER 1 MG: Performed by: INTERNAL MEDICINE

## 2024-11-29 PROCEDURE — 99221 1ST HOSP IP/OBS SF/LOW 40: CPT

## 2024-11-29 PROCEDURE — 10060 I&D ABSCESS SIMPLE/SINGLE: CPT

## 2024-11-29 PROCEDURE — 87340 HEPATITIS B SURFACE AG IA: CPT | Performed by: STUDENT IN AN ORGANIZED HEALTH CARE EDUCATION/TRAINING PROGRAM

## 2024-11-29 PROCEDURE — 25010000002 CEFTRIAXONE PER 250 MG: Performed by: INTERNAL MEDICINE

## 2024-11-29 PROCEDURE — 86706 HEP B SURFACE ANTIBODY: CPT | Performed by: STUDENT IN AN ORGANIZED HEALTH CARE EDUCATION/TRAINING PROGRAM

## 2024-11-29 PROCEDURE — 25010000002 LIDOCAINE 1% - EPINEPHRINE 1:100000 1 %-1:100000 SOLUTION

## 2024-11-29 PROCEDURE — 83735 ASSAY OF MAGNESIUM: CPT | Performed by: INTERNAL MEDICINE

## 2024-11-29 PROCEDURE — 87040 BLOOD CULTURE FOR BACTERIA: CPT | Performed by: INTERNAL MEDICINE

## 2024-11-29 PROCEDURE — 25810000003 SODIUM CHLORIDE 0.9 % SOLUTION 250 ML FLEX CONT: Performed by: INTERNAL MEDICINE

## 2024-11-29 PROCEDURE — 80048 BASIC METABOLIC PNL TOTAL CA: CPT | Performed by: INTERNAL MEDICINE

## 2024-11-29 PROCEDURE — 25010000002 METRONIDAZOLE 500 MG/100ML SOLUTION: Performed by: STUDENT IN AN ORGANIZED HEALTH CARE EDUCATION/TRAINING PROGRAM

## 2024-11-29 PROCEDURE — G0432 EIA HIV-1/HIV-2 SCREEN: HCPCS | Performed by: STUDENT IN AN ORGANIZED HEALTH CARE EDUCATION/TRAINING PROGRAM

## 2024-11-29 PROCEDURE — 25010000002 MORPHINE PER 10 MG: Performed by: INTERNAL MEDICINE

## 2024-11-29 PROCEDURE — 86803 HEPATITIS C AB TEST: CPT | Performed by: STUDENT IN AN ORGANIZED HEALTH CARE EDUCATION/TRAINING PROGRAM

## 2024-11-29 RX ORDER — METRONIDAZOLE 500 MG/100ML
500 INJECTION, SOLUTION INTRAVENOUS EVERY 8 HOURS
Status: DISCONTINUED | OUTPATIENT
Start: 2024-11-29 | End: 2024-11-30 | Stop reason: HOSPADM

## 2024-11-29 RX ORDER — HYDROCODONE BITARTRATE AND ACETAMINOPHEN 5; 325 MG/1; MG/1
1 TABLET ORAL EVERY 4 HOURS PRN
Status: DISCONTINUED | OUTPATIENT
Start: 2024-11-29 | End: 2024-11-30 | Stop reason: HOSPADM

## 2024-11-29 RX ORDER — LIDOCAINE HYDROCHLORIDE AND EPINEPHRINE 10; 10 MG/ML; UG/ML
10 INJECTION, SOLUTION INFILTRATION; PERINEURAL ONCE
Status: COMPLETED | OUTPATIENT
Start: 2024-11-29 | End: 2024-11-29

## 2024-11-29 RX ADMIN — VANCOMYCIN HYDROCHLORIDE 1250 MG: 1.25 INJECTION, POWDER, LYOPHILIZED, FOR SOLUTION INTRAVENOUS at 08:56

## 2024-11-29 RX ADMIN — HYDROCODONE BITARTRATE AND ACETAMINOPHEN 1 TABLET: 5; 325 TABLET ORAL at 21:50

## 2024-11-29 RX ADMIN — HYDROCODONE BITARTRATE AND ACETAMINOPHEN 1 TABLET: 5; 325 TABLET ORAL at 08:57

## 2024-11-29 RX ADMIN — ESCITALOPRAM OXALATE 20 MG: 10 TABLET ORAL at 08:56

## 2024-11-29 RX ADMIN — METRONIDAZOLE 500 MG: 5 INJECTION, SOLUTION INTRAVENOUS at 14:55

## 2024-11-29 RX ADMIN — BUSPIRONE HYDROCHLORIDE 5 MG: 5 TABLET ORAL at 22:05

## 2024-11-29 RX ADMIN — HYDROCODONE BITARTRATE AND ACETAMINOPHEN 1 TABLET: 5; 325 TABLET ORAL at 00:21

## 2024-11-29 RX ADMIN — LISINOPRIL 10 MG: 5 TABLET ORAL at 08:57

## 2024-11-29 RX ADMIN — ONDANSETRON 4 MG: 2 INJECTION, SOLUTION INTRAMUSCULAR; INTRAVENOUS at 12:59

## 2024-11-29 RX ADMIN — VANCOMYCIN HYDROCHLORIDE 1250 MG: 1.25 INJECTION, POWDER, LYOPHILIZED, FOR SOLUTION INTRAVENOUS at 20:36

## 2024-11-29 RX ADMIN — CEFTRIAXONE 2000 MG: 2 INJECTION, POWDER, FOR SOLUTION INTRAMUSCULAR; INTRAVENOUS at 10:52

## 2024-11-29 RX ADMIN — Medication 10 ML: at 22:00

## 2024-11-29 RX ADMIN — Medication 10 ML: at 08:57

## 2024-11-29 RX ADMIN — LIDOCAINE HYDROCHLORIDE AND EPINEPHRINE 10 ML: 10; 10 INJECTION, SOLUTION INFILTRATION; PERINEURAL at 14:12

## 2024-11-29 RX ADMIN — MORPHINE SULFATE 2 MG: 2 INJECTION, SOLUTION INTRAMUSCULAR; INTRAVENOUS at 12:57

## 2024-11-29 RX ADMIN — METRONIDAZOLE 500 MG: 5 INJECTION, SOLUTION INTRAVENOUS at 20:36

## 2024-11-29 RX ADMIN — HYDROCODONE BITARTRATE AND ACETAMINOPHEN 1 TABLET: 5; 325 TABLET ORAL at 17:16

## 2024-11-29 NOTE — CASE MANAGEMENT/SOCIAL WORK
Discharge Planning Assessment   [facility]     Patient Name: Alicia Ferrer  MRN: [id number]  Today's Date: 11/29/2024    Admit Date: 11/28/2024    Plan: Home. Family can transport at discharge.  Watch for IV antibiotics   Discharge Needs Assessment       Row Name 11/29/24 1506       Living Environment    People in Home spouse;child(digna), dependent    Name(s) of People in Home Mayito    Current Living Arrangements home    Potentially Unsafe Housing Conditions none    In the past 12 months has the electric, gas, oil, or water company threatened to shut off services in your home? No    Primary Care Provided by self    Provides Primary Care For child(digna)    Family Caregiver if Needed spouse    Family Caregiver Names Mayito    Quality of Family Relationships helpful;involved;supportive    Able to Return to Prior Arrangements yes       Resource/Environmental Concerns    Resource/Environmental Concerns none    Transportation Concerns none       Transportation Needs    In the past 12 months, has lack of transportation kept you from medical appointments or from getting medications? no    In the past 12 months, has lack of transportation kept you from meetings, work, or from getting things needed for daily living? No       Food Insecurity    Within the past 12 months, you worried that your food would run out before you got the money to buy more. Never true    Within the past 12 months, the food you bought just didn't last and you didn't have money to get more. Never true       Transition Planning    Patient/Family Anticipates Transition to home with family    Patient/Family Anticipated Services at Transition none    Transportation Anticipated family or friend will provide       Discharge Needs Assessment    Readmission Within the Last 30 Days no previous admission in last 30 days    Equipment Currently Used at Home none    Anticipated Changes Related to Illness none    Equipment Needed After Discharge none    Provided Post Acute  Provider List? N/A    N/A Provider List Comment denies dc needs                   Discharge Plan       Row Name 11/29/24 1506       Plan    Plan Home. Family can transport at discharge.  Watch for IV antibiotics    Patient/Family in Agreement with Plan yes    Plan Comments Patient lives at home with  and child . Patient drives and works.  Family  will transport at discharge. Patient performs ADL. PCP and pharmacy confirmed. Denies financial assistance needs for medication and/or food. Denies HH and/or rehab needs. DC barriers: cultures pending ,monitoring labs                  Continued Care and Services - Admitted Since 11/28/2024    No active coordination exists for this encounter.       Expected Discharge Date and Time       Expected Discharge Date Expected Discharge Time    Nov 30, 2024            Demographic Summary       Row Name 11/29/24 1505       General Information    Admission Type inpatient    Arrived From emergency department    Referral Source admission list    Reason for Consult discharge planning    Preferred Language English       Contact Information    Permission Granted to Share Info With                    Functional Status       Row Name 11/29/24 1506       Functional Status    Usual Activity Tolerance excellent    Current Activity Tolerance excellent       Functional Status, IADL    Medications independent    Meal Preparation independent    Housekeeping independent    Laundry independent    Shopping independent       Mental Status    General Appearance WDL WDL       Mental Status Summary    Recent Changes in Mental Status/Cognitive Functioning no changes                  Oksana Marcus RN    SIPS 1  [email or web address]  Office [phone]  Cell [phone]

## 2024-11-29 NOTE — CONSULTS
General Surgery Consult Note      Name: Alicia Ferrer ADMIT: 2024   : 1990  PCP: Alicia Lawton APRN    MRN: [id number] LOS: 1 days   AGE/SEX: 34 y.o. female  ROOM: [address] [place] [address]      Patient Care Team:  Alicia Lawton APRN as PCP - General (Nurse Practitioner)  Chief Complaint   Patient presents with    Abscess       Subjective   Patient is a 37-year-old female presents to the ED with complaints of right inner thigh pain, swelling and redness.  Reports that she noticed the symptoms 3 days ago at home.  Has never had something like this before.  Denies nausea and vomiting.  Does report fevers and chills a few days ago.  Denies shortness of breath and chest pain.    Past Medical History:   Diagnosis Date    Anxiety     Just now put on med little more than yr ago    Gestational diabetes     Gestational hypertension     Hypertension     Migraine     Psoriasis     scalp and abdomen, elbows    Tremor     benign-- Dr. Seipel diagnosed    Urinary tract infection     recurrent    Varicella      Past Surgical History:   Procedure Laterality Date     SECTION  2019     SECTION WITH TUBAL N/A 2024    Procedure:  SECTION REPEAT WITH TUBAL;  Surgeon: Luanne Streeter MD;  Location: [facility] LABOR DELIVERY;  Service: Obstetrics;  Laterality: N/A;    EYE SURGERY      Lasik    KNEE SURGERY      OTHER SURGICAL HISTORY      birth hector removal-head    TONSILLECTOMY AND ADENOIDECTOMY  2006    WISDOM TOOTH EXTRACTION       Family History   Problem Relation Age of Onset    Hypertension Father     Hypertension Mother     Hyperlipidemia Mother     Hypertension Sister     Depression Sister     Hyperlipidemia Sister     Miscarriages / Stillbirths Sister     Hyperlipidemia Sister     Heart disease Paternal Grandfather     Stroke Paternal Grandmother     Heart disease Paternal Grandmother     Heart disease Maternal Grandmother     Cancer Maternal  Grandfather     Lung cancer Maternal Grandfather        Social History     Tobacco Use    Smoking status: Former     Current packs/day: 0.50     Average packs/day: 0.5 packs/day for 12.9 years (6.5 ttl pk-yrs)     Types: Cigarettes     Start date: 2012     Quit date: 2007    Smokeless tobacco: Never   Vaping Use    Vaping status: Never Used   Substance Use Topics    Alcohol use: Not Currently     Comment: Only on occasion    Drug use: Never     Medications Prior to Admission   Medication Sig Dispense Refill Last Dose/Taking    busPIRone (BUSPAR) 5 MG tablet Take 1 tablet by mouth 2 (Two) Times a Day As Needed for anxiety 60 tablet 0     escitalopram (Lexapro) 20 MG tablet Take 1 tablet by mouth Daily. 90 tablet 1     lisinopril (PRINIVIL,ZESTRIL) 10 MG tablet Take 1 tablet by mouth Daily. 90 tablet 1     triamcinolone (KENALOG) 0.1 % cream Apply pea size amount topically to the affected area(s) three times daily as directed. 80 g 3      cefTRIAXone, 2,000 mg, Intravenous, Q24H  escitalopram, 20 mg, Oral, Daily  lisinopril, 10 mg, Oral, Daily  metroNIDAZOLE, 500 mg, Intravenous, Q8H  sodium chloride, 10 mL, Intravenous, Q12H  vancomycin, 1,250 mg, Intravenous, Q12H      Pharmacy to dose vancomycin,         acetaminophen **OR** acetaminophen **OR** acetaminophen    senna-docusate sodium **AND** polyethylene glycol **AND** bisacodyl **AND** bisacodyl    busPIRone    HYDROcodone-acetaminophen    Morphine **AND** naloxone    ondansetron    Pharmacy to dose vancomycin    [COMPLETED] Insert peripheral IV **AND** sodium chloride    sodium chloride    sodium chloride  Patient has no known allergies.    Review of Systems   Constitutional:  Positive for chills and fever. Negative for appetite change.   Respiratory:  Negative for shortness of breath.    Cardiovascular:  Negative for chest pain.   Gastrointestinal:  Negative for abdominal pain, nausea and vomiting.   Skin:  Positive for color change and wound.        Objective      Vital Signs and Labs:  Vital Signs Patient Vitals for the past 24 hrs:   BP Temp Temp src Pulse Resp SpO2   11/29/24 1150 143/78 97.8 °F (36.6 °C) Oral 76 18 98 %   11/29/24 0726 156/88 98.5 °F (36.9 °C) Oral 73 14 98 %   11/29/24 0432 131/65 97.8 °F (36.6 °C) Oral 58 17 98 %   11/29/24 0027 113/73 97.9 °F (36.6 °C) Oral 69 16 97 %   11/28/24 1958 153/94 98.1 °F (36.7 °C) Oral 73 16 97 %   11/28/24 1741 142/80 97.8 °F (36.6 °C) -- 83 -- 97 %   11/28/24 1618 138/75 97.8 °F (36.6 °C) Oral 61 16 100 %     I/O:  I/O last 3 completed shifts:  In: 1340 [P.O.:240; IV Piggyback:1100]  Out: 400 [Urine:400]    Physical Exam:  Physical Exam  Constitutional:       General: She is not in acute distress.  Cardiovascular:      Rate and Rhythm: Normal rate.   Pulmonary:      Effort: Pulmonary effort is normal. No respiratory distress.   Abdominal:      General: There is no distension.      Palpations: Abdomen is soft.      Tenderness: There is no abdominal tenderness. There is no guarding.   Skin:     Comments: Right inner thigh with cellulitis.  Of note, ulceration noted on right thigh with purulent drainage.  Induration and erythema noted.   Neurological:      Mental Status: She is alert. Mental status is at baseline.   Psychiatric:         Mood and Affect: Mood normal.         Behavior: Behavior normal.         CBC    Results from last 7 days   Lab Units 11/29/24  0054 11/28/24  1436   WBC 10*3/mm3 12.09* 14.03*   HEMOGLOBIN g/dL 11.0* 11.0*   PLATELETS 10*3/mm3 260 252     BMP   Results from last 7 days   Lab Units 11/29/24  0054 11/28/24  1436   SODIUM mmol/L 139 136   POTASSIUM mmol/L 3.4* 3.8   CHLORIDE mmol/L 106 101   CO2 mmol/L 24.5 27.4   BUN mg/dL 10 11   CREATININE mg/dL 0.76 0.83   GLUCOSE mg/dL 107* 90   MAGNESIUM mg/dL 2.3  --      Radiology(recent) CT Lower Extremity Right With Contrast    Result Date: 11/28/2024  Impression: 1.Evidence for soft tissue cellulitis involving the medial aspect of the right thigh.  There is no evidence for abscess. 2.No evidence for osteomyelitis. Electronically Signed: Andres Del Toro MD  11/28/2024 2:55 PM EST  Workstation ID: [id number]     I reviewed the patient's new clinical results.    Assessment & Plan       Cellulitis of right thigh      34 y.o. female admitted with right thigh pain, found to have cellulitis of right thigh with possible abscess    Upon examination, wound appears to be expressing purulent drainage.  Discussed management options . Discussed performing incision and drainage bedside or in the OR.The procedure, risk, and complications have been discussed with her and she has elected to move forward with bedside incision and drainage this afternoon.  After completion of bedside procedure, she is okay to discharge on course of antibiotics once medically cleared.  She will follow-up with me in office in 2 weeks.      This note was created using Dragon Voice Recognition software.    TERRENCE Mckay  11/29/24  15:45 EST

## 2024-11-29 NOTE — PROGRESS NOTES
[facility] MEDICINE SERVICE  DAILY PROGRESS NOTE    NAME: Alicia Ferrer  : 1990  MRN: [id number]      LOS: 1 day     PROVIDER OF SERVICE: Jamey Best MD    Chief Complaint: Cellulitis of right thigh    Subjective:     Interval History:  History taken from: patient    No acute overnight events. Patient is pleasant, confirmed with, all history consistent in H&P, surgery consulted, otherwise denies any fever chills overnight.     Review of Systems:   Review of Systems    Objective:     Vital Signs  Temp:  [97.8 °F (36.6 °C)-98.5 °F (36.9 °C)] 97.8 °F (36.6 °C)  Heart Rate:  [58-83] 76  Resp:  [14-18] 18  BP: (113-156)/(65-94) 143/78   Body mass index is 48.26 kg/m².    Physical Exam  General Appearance:  Alert, cooperative, no distress, appears stated age  Head:   Normocephalic, without obvious abnormality, atraumatic  Eyes:   PERRL, conjunctiva/corneas clear, EOM's intact, fundi benign, both eyes  Ears:    Normal TM's and external ear canals, both ears  Nose:Nares normal, septum midline, mucosa normal, no drainage or sinus tenderness  Throat:Lips, mucosa, and tongue normal; teeth and gums normal  Neck:Supple, symmetrical, trachea midline, no adenopathy, thyroid: not enlarged, symmetric, no tenderness/mass/nodules, no carotid bruit or JVD  Lungs:             Clear to auscultation bilaterally, respirations unlabored  Heart:  Regular rate and rhythm, S1, S2 normal, no murmur, rub or gallop  Abdomen:  Soft, non-tender, bowel sounds active all four quadrants,  no masses, no organomegaly  Extremities:Right inner thigh wound with surrounding induration measuring 6 x 6 cm with large surrounding area of erythema, with mild purulent drainage, atraumatic, no cyanosis or edema  Pulses:2+ and symmetric  Skin:Skin color, texture, turgor normal, no rashes or lesions  Neurologic: Normal              Scheduled Meds   cefTRIAXone, 2,000 mg, Intravenous, Q24H  escitalopram, 20 mg, Oral, Daily  lisinopril, 10  mg, Oral, Daily  metroNIDAZOLE, 500 mg, Intravenous, Q8H  sodium chloride, 10 mL, Intravenous, Q12H  vancomycin, 1,250 mg, Intravenous, Q12H       PRN Meds     acetaminophen **OR** acetaminophen **OR** acetaminophen    senna-docusate sodium **AND** polyethylene glycol **AND** bisacodyl **AND** bisacodyl    busPIRone    HYDROcodone-acetaminophen    Morphine **AND** naloxone    ondansetron    Pharmacy to dose vancomycin    [COMPLETED] Insert peripheral IV **AND** sodium chloride    sodium chloride    sodium chloride   Infusions  Pharmacy to dose vancomycin,           Diagnostic Data    Results from last 7 days   Lab Units 11/29/24  0054 11/28/24  1436   WBC 10*3/mm3 12.09* 14.03*   HEMOGLOBIN g/dL 11.0* 11.0*   HEMATOCRIT % 35.0 34.8   PLATELETS 10*3/mm3 260 252   GLUCOSE mg/dL 107* 90   CREATININE mg/dL 0.76 0.83   BUN mg/dL 10 11   SODIUM mmol/L 139 136   POTASSIUM mmol/L 3.4* 3.8   AST (SGOT) U/L  --  20   ALT (SGPT) U/L  --  24   ALK PHOS U/L  --  67   BILIRUBIN mg/dL  --  0.4   ANION GAP mmol/L 8.5 7.6       CT Lower Extremity Right With Contrast    Result Date: 11/28/2024  Impression: 1.Evidence for soft tissue cellulitis involving the medial aspect of the right thigh. There is no evidence for abscess. 2.No evidence for osteomyelitis. Electronically Signed: Andres Del Toro MD  11/28/2024 2:55 PM EST  Workstation ID: [id number]       I reviewed the patient's new clinical results.    Assessment/Plan:   Alicia Ferrer is a 34 y.o. female with a CMH of hypertension who presents to the hospital with complaints of right thigh wound with surrounding erythema and pain.  The patient states she first noticed a pimple like lesion on her right inner thigh several days prior to admission. She attempted to squeeze it to open it up and was able to extract a small amount of purulent material. She went to the pharmacy and obtained some dressings to further assist in draining the lesion, however it continued to get larger and more  erythematous surrounding the lesion. She did have some chills and subjective fever 2 days prior to admission. She also has noticed significant erythema surrounding the lesion.     Right thigh cellulitis with abscess  -Patient presents with a fairly sizable wound with induration and erythema of the right thigh  -CT of the lower extremity did not show any abscess however there was a large area of induration surrounding the purulent draining wound  -Initiated on IV antibiotics with vancomycin, ceftriaxone  -General Surgery consult; patient may require further I&D of the wound  -Check wound and blood cultures and de-escalate antibiotic therapy based on final culture results     Hypertension  -Resume home lisinopril dose     Morbid Obesity  -Counseled patient on diet and exercise to improve weight loss and comorbid conditions       VTE Prophylaxis:  Mechanical VTE prophylaxis orders are present.         Code status is   There are no questions and answers to display.       Plan for disposition:Surgery for drainage, IV abx for now, possible dc on 12/1    Time: 30 minutes    Part of this note may be an electronic transcription/translation of spoken language to printed text using the Dragon Dictation System.    Signature: Electronically signed by Jamey Best MD, 11/29/24, 15:52 EST.  [facility] Hospitalist Team

## 2024-11-29 NOTE — PAYOR COMM NOTE
"Dora Link (34 y.o. Female)       Date of Birth   1990    Social Security Number       Address   [address] [place] [address] [place] [facility]    Home Phone   [phone]    MRN   [id number]       Jain   [employer]    Marital Status                               Admission Date   24    Admission Type   Urgent    Admitting Provider   Checo Shah MD    Attending Provider   Jamey Best MD    Department, Room/Bed   [facility] SURGICAL INPATIENT, /       Discharge Date       Discharge Disposition       Discharge Destination                                 Attending Provider: Jamey Bset MD    Allergies: No Known Allergies    Isolation: None   Infection: None   Code Status: Prior    Ht: 167.6 cm (66\")   Wt: 136 kg (299 lb)    Admission Cmt: None   Principal Problem: Cellulitis of right thigh [L03.115]                   Active Insurance as of 2024       Primary Coverage       Payor Plan Insurance Group Employer/Plan Group    [facility] BLUE CROSS [employer] EMPLOYEE [id number]       Payor Plan Address Payor Plan Phone Number Payor Plan Fax Number Effective Dates    [address] [phone]  2020 - None Entered    [place] [address]         Subscriber Name Subscriber Birth Date Member ID       DORA LINK 1990 [id number]                     Emergency Contacts        (Rel.) Home Phone Work Phone Mobile Phone    CORAZON LINK (Spouse) [phone] -- [phone]                 History & Physical        Checo Shah MD at [address] [place] [address] Medicine Services  History & Physical    Patient Name: Dora Link  : 1990  MRN: [id number]  Primary Care Physician:  Dora Lawton APRN  Date of admission: 2024  Date and Time of Service: 2024 at 1800    Subjective      Chief Complaint: Right thigh wound with erythema and pain    History of Present Illness: Dora Link is a " 34 y.o. female with a CMH of hypertension who presents to the hospital with complaints of right thigh wound with surrounding erythema and pain.  The patient states she first noticed a pimple like lesion on her right inner thigh several days prior to admission. She attempted to squeeze it to open it up and was able to extract a small amount of purulent material. She went to the pharmacy and obtained some dressings to further assist in draining the lesion, however it continued to get larger and more erythematous surrounding the lesion. She did have some chills and subjective fever 2 days prior to admission. She also has noticed significant erythema surrounding the lesion.       Review of Systems  10 Point ROS negative except for above.     Personal History     Past Medical History:   Diagnosis Date    Anxiety     Just now put on med little more than yr ago    Gestational diabetes     Gestational hypertension     Hypertension     Migraine     Psoriasis     scalp and abdomen, elbows    Tremor     benign-- Dr. Seipel diagnosed    Urinary tract infection     recurrent    Varicella        Past Surgical History:   Procedure Laterality Date     SECTION  2019     SECTION WITH TUBAL N/A 2024    Procedure:  SECTION REPEAT WITH TUBAL;  Surgeon: Luanne Streeter MD;  Location: [facility] LABOR DELIVERY;  Service: Obstetrics;  Laterality: N/A;    EYE SURGERY  2006    Lasik    KNEE SURGERY  2003    OTHER SURGICAL HISTORY      birth hector removal-head    TONSILLECTOMY AND ADENOIDECTOMY      WISDOM TOOTH EXTRACTION         Family History: family history includes Cancer in her maternal grandfather; Depression in her sister; Heart disease in her maternal grandmother, paternal grandfather, and paternal grandmother; Hyperlipidemia in her mother, sister, and sister; Hypertension in her father, mother, and sister; Lung cancer in her maternal grandfather; Miscarriages / Stillbirths in  her sister; Stroke in her paternal grandmother. Otherwise pertinent FHx was reviewed and not pertinent to current issue.    Social History:  reports that she quit smoking about 17 years ago. Her smoking use included cigarettes. She started smoking about 12 years ago. She has a 6.5 pack-year smoking history. She has never used smokeless tobacco. She reports that she does not currently use alcohol. She reports that she does not use drugs.    Home Medications:  Prior to Admission Medications       Prescriptions Last Dose Informant Patient Reported? Taking?    busPIRone (BUSPAR) 5 MG tablet   No No    Take 1 tablet by mouth 2 (Two) Times a Day As Needed for anxiety    escitalopram (Lexapro) 20 MG tablet   No No    Take 1 tablet by mouth Daily.    lisinopril (PRINIVIL,ZESTRIL) 10 MG tablet   No No    Take 1 tablet by mouth Daily.    triamcinolone (KENALOG) 0.1 % cream   No No    Apply pea size amount topically to the affected area(s) three times daily as directed.              Allergies:  No Known Allergies    Objective      Vitals:   Temp:  [97.8 °F (36.6 °C)-98.2 °F (36.8 °C)] 97.8 °F (36.6 °C)  Heart Rate:  [61-83] 83  Resp:  [16-20] 16  BP: (126-152)/(53-80) 142/80  Body mass index is 48.26 kg/m².  Physical Exam  General Appearance:  Alert, cooperative, no distress, appears stated age  Head:  Normocephalic, without obvious abnormality, atraumatic  Eyes:  PERRL, conjunctiva/corneas clear, EOM's intact, fundi benign, both eyes  Ears:  Normal TM's and external ear canals, both ears  Nose: Nares normal, septum midline, mucosa normal, no drainage or sinus tenderness  Throat: Lips, mucosa, and tongue normal; teeth and gums normal  Neck: Supple, symmetrical, trachea midline, no adenopathy, thyroid: not enlarged, symmetric, no tenderness/mass/nodules, no carotid bruit or JVD  Lungs:   Clear to auscultation bilaterally, respirations unlabored  Heart:  Regular rate and rhythm, S1, S2 normal, no murmur, rub or gallop  Abdomen:   Soft, non-tender, bowel sounds active all four quadrants,  no masses, no organomegaly  Extremities: Right inner thigh wound with surrounding induration measuring 6 x 6 cm with large surrounding area of erythema, with mild purulent drainage, atraumatic, no cyanosis or edema  Pulses: 2+ and symmetric  Skin: Skin color, texture, turgor normal, no rashes or lesions  Neurologic: Normal      Diagnostic Data:  Lab Results (last 24 hours)       Procedure Component Value Units Date/Time    Comprehensive Metabolic Panel [[id number]] Collected: 11/28/24 1436    Specimen: Blood Updated: 11/28/24 1512     Glucose 90 mg/dL      BUN 11 mg/dL      Creatinine 0.83 mg/dL      Sodium 136 mmol/L      Potassium 3.8 mmol/L      Chloride 101 mmol/L      CO2 27.4 mmol/L      Calcium 8.6 mg/dL      Total Protein 6.3 g/dL      Albumin 3.6 g/dL      ALT (SGPT) 24 U/L      AST (SGOT) 20 U/L      Alkaline Phosphatase 67 U/L      Total Bilirubin 0.4 mg/dL      Globulin 2.7 gm/dL      A/G Ratio 1.3 g/dL      BUN/Creatinine Ratio 13.3     Anion Gap 7.6 mmol/L      eGFR 93.8 mL/min/1.73     Narrative:      GFR Normal >60  Chronic Kidney Disease <60  Kidney Failure <15      CBC & Differential [[id number]]  (Abnormal) Collected: 11/28/24 1436    Specimen: Blood Updated: 11/28/24 1450    Narrative:      The following orders were created for panel order CBC & Differential.  Procedure                               Abnormality         Status                     ---------                               -----------         ------                     CBC Auto Differential[[id number]]        Abnormal            Final result                 Please view results for these tests on the individual orders.    CBC Auto Differential [[id number]]  (Abnormal) Collected: 11/28/24 1436    Specimen: Blood Updated: 11/28/24 1450     WBC 14.03 10*3/mm3      RBC 4.23 10*6/mm3      Hemoglobin 11.0 g/dL      Hematocrit 34.8 %      MCV 82.3 fL      MCH 26.0 pg      MCHC 31.6 g/dL       RDW 13.0 %      RDW-SD 39.7 fl      MPV 9.4 fL      Platelets 252 10*3/mm3      Neutrophil % 66.0 %      Lymphocyte % 25.0 %      Monocyte % 7.3 %      Eosinophil % 1.2 %      Basophil % 0.3 %      Immature Grans % 0.2 %      Neutrophils, Absolute 9.25 10*3/mm3      Lymphocytes, Absolute 3.51 10*3/mm3      Monocytes, Absolute 1.03 10*3/mm3      Eosinophils, Absolute 0.17 10*3/mm3      Basophils, Absolute 0.04 10*3/mm3      Immature Grans, Absolute 0.03 10*3/mm3              Imaging Results (Last 24 Hours)       Procedure Component Value Units Date/Time    CT Lower Extremity Right With Contrast [[id number]] Collected: 11/28/24 1450     Updated: 11/28/24 1457    Narrative:      CT LOWER EXTREMITY RIGHT W CONTRAST    Date of Exam: 11/28/2024 2:23 PM EST    Indication: possible asbcess to the right inner thigh, large area of redness, drainage,  erythema.    Comparison: None available.    Technique: Axial CT images were obtained of the right lower extremity after the uneventful intravenous administration of iodinated contrast.  Sagittal and coronal reconstructions were performed.  Automated exposure control and iterative reconstruction   methods were used.      Findings:  There is edema noted in the subcutaneous soft tissues along the anteromedial aspect of the proximal to mid right thigh. Associated skin thickening is noted the findings suggest changes of soft tissue cellulitis. No well-defined fluid collection is seen   to suggest abscess.    There is no evidence for significant edema within the underlying deep muscular soft tissues. There is no gas production throughout the soft tissues to indicate signs of necrosis.    No osseous erosion or destruction is seen. There is no evidence for osteomyelitis. There is no periostitis.      Impression:      Impression:  1.Evidence for soft tissue cellulitis involving the medial aspect of the right thigh. There is no evidence for abscess.  2.No evidence for  osteomyelitis.        Electronically Signed: Andres Del Toro MD    11/28/2024 2:55 PM EST    Workstation ID: [id number]              Assessment & Plan        This is a 34 y.o. female with:    Active and Resolved Problems  Active Hospital Problems    Diagnosis  POA    **Cellulitis of right thigh [L03.115]  Yes      Resolved Hospital Problems   No resolved problems to display.       Right thigh cellulitis with abscess  -Patient presents with a fairly sizable wound with induration and erythema of the right thigh  -CT of the lower extremity did not show any abscess however there was a large area of induration surrounding the purulent draining wound  -Initiated on IV antibiotics with vancomycin, ceftriaxone  -General Surgery consult; patient may require further I&D of the wound  -Check wound and blood cultures and de-escalate antibiotic therapy based on final culture results    Hypertension  -Resume home lisinopril dose    Morbid Obesity  -Counseled patient on diet and exercise to improve weight loss and comorbid conditions    VTE Prophylaxis:  Mechanical VTE prophylaxis orders are present.        The patient desires to be as follows:    CODE STATUS:               Admission Status:  I believe this patient meets inpatient status.    Expected Length of Stay: Greater than 2 midnights    PDMP and Medication Dispenses via Sidebar reviewed and consistent with patient reported medications.    I discussed the patient's findings and my recommendations with patient.      Signature:     This document has been electronically signed by Checo Shah MD on November 28, 2024 18:04 [facility] Hospitalist Team    Electronically signed by Checo Shah MD at 11/28/24 1812          Emergency Department Notes        Cindy Andrews, RN at 11/28/24 1629          Pt left department to go to [facility] POV; admitted to room 4106. IV in place, wrapped with coban. Pt and spouse advised where to go at [place], paperwork and directions provided.  Advised to not infuse or inject anything into IV. Verbalized understanding.     Electronically signed by Cindy Andrews RN at 24 1635       Leigh Aguilar APRN at 24 1412       Attestation signed by Tenzin Chavez MD at 24 [phone]        SUPERVISE: For this patient encounter, I reviewed the APC's documentation, treatment plan, and medical decision making.  Tenzin Chavez MD 2024 23:53 EST                         Subjective   History of Present Illness  The patient is a 34-year-old female who presents to the ER with possible abscess to the right inner thigh.  Patient reports that she noticed this 3 days ago at home.  Patient reports fever and chills 2 nights ago.    History provided by:  Patient   used: No        Review of Systems   Skin:  Positive for wound.        Patient with erythema, firmness to inner right thigh,.        Past Medical History:   Diagnosis Date    Anxiety     Just now put on med little more than yr ago    Gestational diabetes     Gestational hypertension     Hypertension     Migraine     Psoriasis     scalp and abdomen, elbows    Tremor     benign-- Dr. Seipel diagnosed    Urinary tract infection     recurrent    Varicella        No Known Allergies    Past Surgical History:   Procedure Laterality Date     SECTION  2019     SECTION WITH TUBAL N/A 2024    Procedure:  SECTION REPEAT WITH TUBAL;  Surgeon: Luanne Streeter MD;  Location: [facility] LABOR DELIVERY;  Service: Obstetrics;  Laterality: N/A;    EYE SURGERY  2006    Lasik    KNEE SURGERY  2003    OTHER SURGICAL HISTORY      birth hector removal-head    TONSILLECTOMY AND ADENOIDECTOMY  2006    WISDOM TOOTH EXTRACTION         Family History   Problem Relation Age of Onset    Hypertension Father     Hypertension Mother     Hyperlipidemia Mother     Hypertension Sister     Depression Sister     Hyperlipidemia Sister     Miscarriages /  Stillbirths Sister     Hyperlipidemia Sister     Heart disease Paternal Grandfather     Stroke Paternal Grandmother     Heart disease Paternal Grandmother     Heart disease Maternal Grandmother     Cancer Maternal Grandfather     Lung cancer Maternal Grandfather        Social History     Socioeconomic History    Marital status:    Tobacco Use    Smoking status: Former     Current packs/day: 0.50     Average packs/day: 0.5 packs/day for 12.9 years (6.5 ttl pk-yrs)     Types: Cigarettes     Start date: 2012     Quit date: 2007    Smokeless tobacco: Never   Vaping Use    Vaping status: Never Used   Substance and Sexual Activity    Alcohol use: Not Currently     Comment: Only on occasion    Drug use: Never    Sexual activity: Yes     Partners: Male     Birth control/protection: None           Objective   Physical Exam  Vitals and nursing note reviewed.   Constitutional:       Appearance: Normal appearance.   HENT:      Head: Normocephalic.      Right Ear: Tympanic membrane and ear canal normal.      Left Ear: Tympanic membrane and ear canal normal.      Nose: Nose normal.      Mouth/Throat:      Mouth: Mucous membranes are moist.      Pharynx: Oropharynx is clear.   Eyes:      Conjunctiva/sclera: Conjunctivae normal.   Pulmonary:      Effort: Pulmonary effort is normal.      Breath sounds: Normal breath sounds.   Musculoskeletal:         General: Normal range of motion.      Cervical back: Full passive range of motion without pain, normal range of motion and neck supple.   Skin:     General: Skin is warm and dry.      Findings: Erythema and wound present.             Comments: Patient with area of redness to right inner thigh that measures approximately 17 cm x 17 cm.   Neurological:      General: No focal deficit present.      Mental Status: She is alert and oriented to person, place, and time.   Psychiatric:         Mood and Affect: Mood normal.         Behavior: Behavior normal. Behavior is cooperative.            Procedures          ED Course  ED Course as of 11/28/24 1611   u Nov 28, 2024   1456 WBC(!): 14.03 [DS]   1456 Hemoglobin(!): 11.0 [DS]   1456 Hematocrit: 34.8 [DS]   1506 CT LOWER EXTREMITY RIGHT W CONTRAST     Date of Exam: 11/28/2024 2:23 PM EST     Indication: possible asbcess to the right inner thigh, large area of redness, drainage,  erythema.     Comparison: None available.     Technique: Axial CT images were obtained of the right lower extremity after the uneventful intravenous administration of iodinated contrast.  Sagittal and coronal reconstructions were performed.  Automated exposure control and iterative reconstruction   methods were used.        Findings:  There is edema noted in the subcutaneous soft tissues along the anteromedial aspect of the proximal to mid right thigh. Associated skin thickening is noted the findings suggest changes of soft tissue cellulitis. No well-defined fluid collection is seen   to suggest abscess.     There is no evidence for significant edema within the underlying deep muscular soft tissues. There is no gas production throughout the soft tissues to indicate signs of necrosis.     No osseous erosion or destruction is seen. There is no evidence for osteomyelitis. There is no periostitis.     IMPRESSION:  Impression:  1.Evidence for soft tissue cellulitis involving the medial aspect of the right thigh. There is no evidence for abscess.  2.No evidence for osteomyelitis.      [DS]   1512 Glucose: 90 [DS]   1513 BUN: 11 [DS]   1513 Creatinine: 0.83 [DS]   1513 Sodium: 136 [DS]   1513 Potassium: 3.8 [DS]   1531 Call placed to hospitalist for admission.  [DS]   1536 Spoke with Dr. Shah, will accept for admission.  [DS]      ED Course User Index  [DS] Leigh Aguilar APRN                                           Medical Decision Making  The patient is a 34-year-old female who presents to the ER with possible abscess to the right inner thigh.  Patient reports  that she noticed this 3 days ago at home.  Patient reports fever and chills 2 nights ago.    This patient presents with initial presentation of local erythema, warmth, swelling concerning for cellulitis of the right inner thigh that measures approximately 17cm x 17 cm. Sensitivity/pain to light touch around the erythematous area. No lymphangitic spread visible and no fluid pockets or fluctuance concerning for abscess noted. Low concern for osteomyelitis or DVT. No immune compromise, bullae, pain out of proportion, or rapid progression concerning for necrotizing fasciitis. Patient with elevated WBC of 14.03, CT shows cellulitis, no abscess noted.     See photo of area.     Problems Addressed:  Cellulitis of right lower extremity: complicated acute illness or injury    Amount and/or Complexity of Data Reviewed  Labs: ordered. Decision-making details documented in ED Course.  Radiology: ordered. Decision-making details documented in ED Course.  Discussion of management or test interpretation with external provider(s): Spoke with Dr. Shah, hospitalist, accepts for admission.     Risk  Decision regarding hospitalization.        Final diagnoses:   Cellulitis of right lower extremity       ED Disposition  ED Disposition       ED Disposition   Decision to Admit    Condition   --    Comment   Level of Care: Med/Surg [1]   Admitting Physician: JOHN SHAH [[id number]]   Attending Physician: JOHN SHAH [[id number]]                 No follow-up provider specified.       Medication List      No changes were made to your prescriptions during this visit.           Electronically signed by Tenzin Chavez MD at 11/28/24 4503       Vital Signs (last 2 days)       Date/Time Temp Temp src Pulse Resp BP Patient Position SpO2    11/29/24 0726 98.5 (36.9) Oral 73 14 156/88 Lying 98    11/29/24 0432 97.8 (36.6) Oral 58 17 131/65 Lying 98    11/29/24 0027 97.9 (36.6) Oral 69 16 113/73 Lying 97    11/28/24 1958 98.1 (36.7) Oral 73 16  153/94 Lying 97    11/28/24 1741 97.8 (36.6) -- 83 -- 142/80 -- 97    11/28/24 16:18:32 97.8 (36.6) Oral 61 16 138/75 Sitting 100    11/28/24 15:37:59 -- -- 72 18 152/76 Sitting 99    11/28/24 14:48:45 -- -- 80 16 136/60 Sitting 96    11/28/24 1313 98.2 (36.8) -- 70 20 126/53 -- 100          Facility-Administered Medications as of 11/29/2024   Medication Dose Route Frequency Provider Last Rate Last Admin    acetaminophen (TYLENOL) tablet 650 mg  650 mg Oral Q4H PRN Checo Shah MD   650 mg at 11/28/24 2045    Or    acetaminophen (TYLENOL) 160 MG/5ML oral solution 650 mg  650 mg Oral Q4H PRN Checo Shah MD        Or    acetaminophen (TYLENOL) suppository 650 mg  650 mg Rectal Q4H PRN Checo Shah MD        sennosides-docusate (PERICOLACE) 8.6-50 MG per tablet 2 tablet  2 tablet Oral BID PRN Checo Shah MD        And    polyethylene glycol (MIRALAX) packet 17 g  17 g Oral Daily PRN Checo Shah MD        And    bisacodyl (DULCOLAX) EC tablet 5 mg  5 mg Oral Daily PRN Checo Shah MD        And    bisacodyl (DULCOLAX) suppository 10 mg  10 mg Rectal Daily PRN Checo Shah MD        busPIRone (BUSPAR) tablet 5 mg  5 mg Oral BID PRN Checo Shah MD        [COMPLETED] cefTRIAXone (ROCEPHIN) 1,000 mg in sodium chloride 0.9 % 100 mL MBP  1,000 mg Intravenous Once Leigh Aguilar APRN   Stopped at 11/28/24 1618    cefTRIAXone (ROCEPHIN) 2,000 mg in sodium chloride 0.9 % 100 mL MBP  2,000 mg Intravenous Q24H Checo Shah MD        escitalopram (LEXAPRO) tablet 20 mg  20 mg Oral Daily Checo Shah MD   20 mg at 11/29/24 0856    HYDROcodone-acetaminophen (NORCO) 5-325 MG per tablet 1 tablet  1 tablet Oral Q6H PRN Checo Shah MD   1 tablet at 11/29/24 0857    [COMPLETED] iopamidol (ISOVUE-370) 76 % injection 100 mL  100 mL Intravenous Once in imaging Tenzin Chavez MD   100 mL at 11/28/24 1440    lisinopril (PRINIVIL,ZESTRIL) tablet 10 mg  10 mg Oral Daily Checo Shah MD   10 mg at  11/29/24 0857    morphine injection 2 mg  2 mg Intravenous Q4H PRN Checo Shah MD        And    naloxone (NARCAN) injection 0.4 mg  0.4 mg Intravenous Q5 Min PRN Checo Shah MD        ondansetron (ZOFRAN) injection 4 mg  4 mg Intravenous Q6H PRN Checo Shah MD        Pharmacy to dose vancomycin   Not Applicable Continuous PRN Checo Shah MD        [COMPLETED] sodium chloride 0.9 % bolus 1,000 mL  1,000 mL Intravenous Once Leigh Aguilar APRN   Stopped at 11/28/24 1618    sodium chloride 0.9 % flush 10 mL  10 mL Intravenous PRN Leigh Aguilar APRN        sodium chloride 0.9 % flush 10 mL  10 mL Intravenous Q12H Checo Shah MD   10 mL at 11/29/24 0857    sodium chloride 0.9 % flush 10 mL  10 mL Intravenous PRN Checo Shah MD        sodium chloride 0.9 % infusion 40 mL  40 mL Intravenous PRN Checo Shah MD        Vancomycin HCl 1,250 mg in sodium chloride 0.9 % 250 mL VTB  1,250 mg Intravenous Q12H Cehco Shah  mL/hr at 11/29/24 0856 1,250 mg at 11/29/24 0856    [COMPLETED] vancomycin IVPB 2000 mg in 0.9% Sodium Chloride 500 mL  2,000 mg Intravenous Once Leigh Aguilar APRN 250 mL/hr at 11/28/24 2026 2,000 mg at 11/28/24 2026       Physician Progress Notes (all)    No notes of this type exist for this encounter.       Consult Notes (all)    No notes of this type exist for this encounter.

## 2024-11-29 NOTE — PROGRESS NOTES
"Pharmacy Antimicrobial Dosing Service    Subjective:  Alicia Ferrer is a 34 y.o.female admitted with cellulitis. Pharmacy has been consulted to dose Vancomycin for possible SSTI.    PMH: HTN, psoriasis, tremors, UTI, varicella, anxiety,       Assessment/Plan    1. Day #2 Vancomycin: Goal -600 mcg*h/mL.   Vanc 2 gm load, followed by 1250 mg q 12 hrs x 5 days.  predicted:  AUC of 505 mg/L.  & predicted Tr level of 13.4 mg/L  Will schedule a vancomycin random after the 3rd dose and a vancomycin trough prior to the 4th dose.      2. Day # 2 Ceftriaxone: 2gm IV q24h.    Will continue to monitor drug levels, renal function, culture and sensitivities, and patient clinical status.       Objective:  Relevant clinical data and objective history reviewed:  167.6 cm (66\")   136 kg (299 lb)   Ideal body weight: 59.3 kg (130 lb 11.7 oz)  Adjusted ideal body weight: 89.8 kg (198 lb 0.6 oz)  Body mass index is 48.26 kg/m².        Results from last 7 days   Lab Units 11/29/24  0054 11/28/24  1436   CREATININE mg/dL 0.76 0.83     Estimated Creatinine Clearance: 145.4 mL/min (by C-G formula based on SCr of 0.76 mg/dL).  I/O last 3 completed shifts:  In: 1340 [P.O.:240; IV Piggyback:1100]  Out: 400 [Urine:400]    Results from last 7 days   Lab Units 11/29/24  0054 11/28/24  1436   WBC 10*3/mm3 12.09* 14.03*     Temperature    11/29/24 0027 11/29/24 0432 11/29/24 0726   Temp: 97.9 °F (36.6 °C) 97.8 °F (36.6 °C) 98.5 °F (36.9 °C)     Baseline culture/source/susceptibility:  Microbiology Results (last 10 days)       Procedure Component Value - Date/Time    Wound Culture - Wound, Thigh, Right [[id number]] Collected: 11/28/24 7119    Lab Status: Preliminary result Specimen: Wound from Thigh, Right Updated: 11/29/24 0706     Gram Stain Rare (1+) Gram positive cocci in pairs            Marta [facility]  11/29/24 10:12 EST      "

## 2024-11-29 NOTE — PROCEDURES
"Operative Report:    Patient Name:  Alicia Ferrer  YOB: 1990    Date of Surgery:  11/29/2024     Indications: See consult note.  In brief, patient is a 34-year-old female with 3-day history of right thigh pain, swelling, and redness.  She is found to have cellulitis with possible abscess.    Pre-op Diagnosis:   Cellulitis of right thigh with abscess       Post-Op Diagnosis Codes:   Same    Procedure(s):  INCISION AND DRAINAGE of Right lower extremeity abcess at bedside    Staff:  TERRENCE Mckay-C - primary  Amadou Mejia MD - readily available to provide further assistance and supervision      Anesthesia: 25 cc of lidocaine    Estimated Blood Loss: Minimal    Implants:    None    Specimen:          None        Findings: Cellulitis of right thigh with abscess, measuring 5 cm x 4 cm.  Seropurulent drainage present in cavity.    Complications: None, immediately    Description of Procedure:   The procedure, risk, and complications have been discussed in detail (including but not limited to bleeding, infection, damage to surrounding tissues) with the patient, and the patient has signed consent to the procedure.    The skin was sterilely draped and prepped over the affected area in the usual fashion.  After adequate local anesthesia, incision and drainage with an #11 blade was performed on the right inner thigh.  Purulent drainage was present.  Incision site was flushed and packed with 1/2\" iodoform and covered with clean dressing.  Patient was observed until stable.      TERRENCE Mckay     Date: 11/29/2024  Time: 16:01 EST    This note was created using Dragon Voice Recognition software.   "

## 2024-11-30 ENCOUNTER — READMISSION MANAGEMENT (OUTPATIENT)
Dept: CALL CENTER | Facility: HOSPITAL | Age: 34
End: 2024-11-30
Payer: COMMERCIAL

## 2024-11-30 VITALS
BODY MASS INDEX: 47.09 KG/M2 | DIASTOLIC BLOOD PRESSURE: 91 MMHG | HEIGHT: 66 IN | TEMPERATURE: 98 F | SYSTOLIC BLOOD PRESSURE: 144 MMHG | OXYGEN SATURATION: 98 % | WEIGHT: 293 LBS | RESPIRATION RATE: 16 BRPM | HEART RATE: 70 BPM

## 2024-11-30 LAB
ALBUMIN SERPL-MCNC: 3.8 G/DL (ref 3.5–5.2)
ALBUMIN/GLOB SERPL: 1.3 G/DL
ALP SERPL-CCNC: 62 U/L (ref 39–117)
ALT SERPL W P-5'-P-CCNC: 30 U/L (ref 1–33)
ANION GAP SERPL CALCULATED.3IONS-SCNC: 8.5 MMOL/L (ref 5–15)
AST SERPL-CCNC: 19 U/L (ref 1–32)
BASOPHILS # BLD AUTO: 0.04 10*3/MM3 (ref 0–0.2)
BASOPHILS NFR BLD AUTO: 0.5 % (ref 0–1.5)
BILIRUB SERPL-MCNC: 0.3 MG/DL (ref 0–1.2)
BUN SERPL-MCNC: 9 MG/DL (ref 6–20)
BUN/CREAT SERPL: 13 (ref 7–25)
CALCIUM SPEC-SCNC: 8.8 MG/DL (ref 8.6–10.5)
CHLORIDE SERPL-SCNC: 104 MMOL/L (ref 98–107)
CO2 SERPL-SCNC: 24.5 MMOL/L (ref 22–29)
CREAT SERPL-MCNC: 0.69 MG/DL (ref 0.57–1)
DEPRECATED RDW RBC AUTO: 38.4 FL (ref 37–54)
EGFRCR SERPLBLD CKD-EPI 2021: 115.5 ML/MIN/1.73
EOSINOPHIL # BLD AUTO: 0.2 10*3/MM3 (ref 0–0.4)
EOSINOPHIL NFR BLD AUTO: 2.3 % (ref 0.3–6.2)
ERYTHROCYTE [DISTWIDTH] IN BLOOD BY AUTOMATED COUNT: 12.7 % (ref 12.3–15.4)
GLOBULIN UR ELPH-MCNC: 2.9 GM/DL
GLUCOSE SERPL-MCNC: 109 MG/DL (ref 65–99)
HBA1C MFR BLD: 5.43 % (ref 4.8–5.6)
HBV SURFACE AB SER RIA-ACNC: REACTIVE
HCT VFR BLD AUTO: 37.3 % (ref 34–46.6)
HGB BLD-MCNC: 11.6 G/DL (ref 12–15.9)
IMM GRANULOCYTES # BLD AUTO: 0.04 10*3/MM3 (ref 0–0.05)
IMM GRANULOCYTES NFR BLD AUTO: 0.5 % (ref 0–0.5)
LYMPHOCYTES # BLD AUTO: 2.74 10*3/MM3 (ref 0.7–3.1)
LYMPHOCYTES NFR BLD AUTO: 31.2 % (ref 19.6–45.3)
MAGNESIUM SERPL-MCNC: 2 MG/DL (ref 1.6–2.6)
MCH RBC QN AUTO: 25.6 PG (ref 26.6–33)
MCHC RBC AUTO-ENTMCNC: 31.1 G/DL (ref 31.5–35.7)
MCV RBC AUTO: 82.3 FL (ref 79–97)
MONOCYTES # BLD AUTO: 0.59 10*3/MM3 (ref 0.1–0.9)
MONOCYTES NFR BLD AUTO: 6.7 % (ref 5–12)
NEUTROPHILS NFR BLD AUTO: 5.17 10*3/MM3 (ref 1.7–7)
NEUTROPHILS NFR BLD AUTO: 58.8 % (ref 42.7–76)
NRBC BLD AUTO-RTO: 0 /100 WBC (ref 0–0.2)
PHOSPHATE SERPL-MCNC: 3.5 MG/DL (ref 2.5–4.5)
PLATELET # BLD AUTO: 273 10*3/MM3 (ref 140–450)
PMV BLD AUTO: 9.4 FL (ref 6–12)
POTASSIUM SERPL-SCNC: 3.8 MMOL/L (ref 3.5–5.2)
PROT SERPL-MCNC: 6.7 G/DL (ref 6–8.5)
RBC # BLD AUTO: 4.53 10*6/MM3 (ref 3.77–5.28)
SODIUM SERPL-SCNC: 137 MMOL/L (ref 136–145)
VANCOMYCIN TROUGH SERPL-MCNC: <4 MCG/ML (ref 5–20)
WBC NRBC COR # BLD AUTO: 8.78 10*3/MM3 (ref 3.4–10.8)

## 2024-11-30 PROCEDURE — 83036 HEMOGLOBIN GLYCOSYLATED A1C: CPT | Performed by: STUDENT IN AN ORGANIZED HEALTH CARE EDUCATION/TRAINING PROGRAM

## 2024-11-30 PROCEDURE — 84100 ASSAY OF PHOSPHORUS: CPT | Performed by: STUDENT IN AN ORGANIZED HEALTH CARE EDUCATION/TRAINING PROGRAM

## 2024-11-30 PROCEDURE — 25010000002 VANCOMYCIN 1.75-0.9 GM/500ML-% SOLUTION: Performed by: STUDENT IN AN ORGANIZED HEALTH CARE EDUCATION/TRAINING PROGRAM

## 2024-11-30 PROCEDURE — 25010000002 CEFTRIAXONE PER 250 MG: Performed by: INTERNAL MEDICINE

## 2024-11-30 PROCEDURE — 80202 ASSAY OF VANCOMYCIN: CPT | Performed by: INTERNAL MEDICINE

## 2024-11-30 PROCEDURE — 80053 COMPREHEN METABOLIC PANEL: CPT | Performed by: STUDENT IN AN ORGANIZED HEALTH CARE EDUCATION/TRAINING PROGRAM

## 2024-11-30 PROCEDURE — 83735 ASSAY OF MAGNESIUM: CPT | Performed by: INTERNAL MEDICINE

## 2024-11-30 PROCEDURE — 25010000002 METRONIDAZOLE 500 MG/100ML SOLUTION: Performed by: STUDENT IN AN ORGANIZED HEALTH CARE EDUCATION/TRAINING PROGRAM

## 2024-11-30 PROCEDURE — 85025 COMPLETE CBC W/AUTO DIFF WBC: CPT | Performed by: INTERNAL MEDICINE

## 2024-11-30 RX ORDER — SULFAMETHOXAZOLE AND TRIMETHOPRIM 800; 160 MG/1; MG/1
1 TABLET ORAL 2 TIMES DAILY
Qty: 14 TABLET | Refills: 0 | Status: SHIPPED | OUTPATIENT
Start: 2024-11-30 | End: 2024-11-30

## 2024-11-30 RX ORDER — SULFAMETHOXAZOLE AND TRIMETHOPRIM 800; 160 MG/1; MG/1
1 TABLET ORAL 2 TIMES DAILY
Qty: 14 TABLET | Refills: 0 | Status: SHIPPED | OUTPATIENT
Start: 2024-11-30 | End: 2024-12-07

## 2024-11-30 RX ORDER — VANCOMYCIN 1.75 GRAM/500 ML IN 0.9 % SODIUM CHLORIDE INTRAVENOUS
1750 EVERY 12 HOURS
Status: DISCONTINUED | OUTPATIENT
Start: 2024-11-30 | End: 2024-11-30 | Stop reason: HOSPADM

## 2024-11-30 RX ADMIN — CEFTRIAXONE 2000 MG: 2 INJECTION, POWDER, FOR SOLUTION INTRAMUSCULAR; INTRAVENOUS at 08:48

## 2024-11-30 RX ADMIN — HYDROCODONE BITARTRATE AND ACETAMINOPHEN 1 TABLET: 5; 325 TABLET ORAL at 04:42

## 2024-11-30 RX ADMIN — ACETAMINOPHEN 650 MG: 325 TABLET, FILM COATED ORAL at 08:48

## 2024-11-30 RX ADMIN — Medication 1750 MG: at 10:47

## 2024-11-30 RX ADMIN — Medication 10 ML: at 09:05

## 2024-11-30 RX ADMIN — ESCITALOPRAM OXALATE 20 MG: 10 TABLET ORAL at 08:48

## 2024-11-30 RX ADMIN — LISINOPRIL 10 MG: 5 TABLET ORAL at 08:48

## 2024-11-30 RX ADMIN — HYDROCODONE BITARTRATE AND ACETAMINOPHEN 1 TABLET: 5; 325 TABLET ORAL at 14:39

## 2024-11-30 RX ADMIN — METRONIDAZOLE 500 MG: 5 INJECTION, SOLUTION INTRAVENOUS at 04:29

## 2024-11-30 NOTE — PROGRESS NOTES
General Surgery Progress Note    Name: Alicia Ferrer ADMIT: 2024   : 1990  PCP: Alicia Lawton APRN    MRN: [id number] LOS: 2 days   AGE/SEX: 34 y.o. female  ROOM: [address] [place] [address]    Chief Complaint   Patient presents with    Abscess     Subjective     Patient seen examined.  Vital signs stable, afebrile.  Overall doing well this morning, reports improvement in pain of right thigh.  Denies fevers and chills.  Tolerating diet without nausea and vomiting.    Objective     Scheduled Medications:   cefTRIAXone, 2,000 mg, Intravenous, Q24H  escitalopram, 20 mg, Oral, Daily  lisinopril, 10 mg, Oral, Daily  metroNIDAZOLE, 500 mg, Intravenous, Q8H  sodium chloride, 10 mL, Intravenous, Q12H  vancomycin, 1,750 mg, Intravenous, Q12H        Active Infusions:  Pharmacy to dose vancomycin,         As Needed Medications:    acetaminophen **OR** acetaminophen **OR** acetaminophen    senna-docusate sodium **AND** polyethylene glycol **AND** bisacodyl **AND** bisacodyl    busPIRone    HYDROcodone-acetaminophen    Morphine **AND** naloxone    ondansetron    Pharmacy to dose vancomycin    [COMPLETED] Insert peripheral IV **AND** sodium chloride    sodium chloride    sodium chloride    Vital Signs  Vital Signs Patient Vitals for the past 24 hrs:   BP Temp Temp src Pulse Resp SpO2   24 0848 144/91 -- -- -- -- --   24 0455 144/84 97.9 °F (36.6 °C) Oral 65 16 96 %   24 2339 118/69 97.9 °F (36.6 °C) Oral 60 18 97 %   24 2114 133/78 97.8 °F (36.6 °C) Oral 65 18 97 %   24 1629 169/84 97.8 °F (36.6 °C) Oral 68 21 100 %   24 1150 143/78 97.8 °F (36.6 °C) Oral 76 18 98 %     I/O:  I/O last 3 completed shifts:  In: 1200 [P.O.:1200]  Out: 400 [Urine:400]    Physical Exam:  Physical Exam  Constitutional:       General: She is not in acute distress.  Cardiovascular:      Rate and Rhythm: Normal rate.   Pulmonary:      Effort: Pulmonary effort is normal. No respiratory distress.   Abdominal:       General: There is no distension.      Palpations: Abdomen is soft.      Tenderness: There is no abdominal tenderness. There is no guarding.   Musculoskeletal:      Comments: Right thigh with packing in place, reinforce with clean dressing.  Appears induration and erythema have improved since previous examination.  Minimal tenderness to palpation.   Neurological:      Mental Status: She is alert. Mental status is at baseline.   Psychiatric:         Mood and Affect: Mood normal.         Behavior: Behavior normal.         Results Review:     CBC    Results from last 7 days   Lab Units 11/30/24  0600 11/29/24  0054 11/28/24  1436   WBC 10*3/mm3 8.78 12.09* 14.03*   HEMOGLOBIN g/dL 11.6* 11.0* 11.0*   PLATELETS 10*3/mm3 273 260 252     BMP   Results from last 7 days   Lab Units 11/30/24  0600 11/29/24 0054 11/28/24  1436   SODIUM mmol/L 137 139 136   POTASSIUM mmol/L 3.8 3.4* 3.8   CHLORIDE mmol/L 104 106 101   CO2 mmol/L 24.5 24.5 27.4   BUN mg/dL 9 10 11   CREATININE mg/dL 0.69 0.76 0.83   GLUCOSE mg/dL 109* 107* 90   MAGNESIUM mg/dL 2.0 2.3  --    PHOSPHORUS mg/dL 3.5  --   --      Radiology(recent) CT Lower Extremity Right With Contrast    Result Date: 11/28/2024  Impression: 1.Evidence for soft tissue cellulitis involving the medial aspect of the right thigh. There is no evidence for abscess. 2.No evidence for osteomyelitis. Electronically Signed: Andres Del Toro MD  11/28/2024 2:55 PM EST  Workstation ID: [id number]     I reviewed the patient's new clinical results.    Assessment & Plan       Cellulitis of right thigh      34 y.o. female POD 1 status post bedside incision and drainage of right thigh abscess     -Diet as tolerated  -Pain medication as needed  -Continue antibiotics  -Awaiting cultures  -Okay to discharge from general surgery perspective once medically cleared.  She will follow-up with me in office in 2 weeks.    -If she remains inpatient, we will remove packing tomorrow morning    This note was  created using Dragon Voice Recognition software.    TERRENCE Mckay  11/30/24  09:55 EST

## 2024-11-30 NOTE — PROGRESS NOTES
"Pharmacy Antimicrobial Dosing Service    Subjective:  Alicia Ferrer is a 34 y.o.female admitted with cellulitis. Pharmacy has been consulted to dose Vancomycin for possible SSTI.    PMH: HTN, psoriasis, tremors, UTI, varicella, anxiety,       Assessment/Plan    1. Day #3 Vancomycin: Goal -600 mcg*h/mL.   Patient has been on 1250 mg q12h. Peak collected incorrectly prior to third dose resulted =  7.4 mcg/mL + trough resulted this morning = <4 mcg/mL. Predicted AUC subtherapeutic on current regimen. Will plan to increase dose to 1750 q12h (19.4 mg/kg AdjBW) for predicted AUC = 434. Will not plan to collect levels at this time, will monitor plan for duration of vancomycin pending cultures results and patient clinical status.       2. Day # 3 Ceftriaxone: 2gm IV q24h.      3. Day # 2 Metronidazole 500 mg IV q8h    Will continue to monitor drug levels, renal function, culture and sensitivities, and patient clinical status.       Objective:  Relevant clinical data and objective history reviewed:  167.6 cm (66\")   136 kg (299 lb)   Ideal body weight: 59.3 kg (130 lb 11.7 oz)  Adjusted ideal body weight: 89.8 kg (198 lb 0.6 oz)  Body mass index is 48.26 kg/m².    Results from last 7 days   Lab Units 11/30/24  0600 11/29/24  1609   VANCOMYCIN TR mcg/mL <4.00*  --    VANCOMYCIN RM mcg/mL  --  7.40     Results from last 7 days   Lab Units 11/30/24  0600 11/29/24  0054 11/28/24  1436   CREATININE mg/dL 0.69 0.76 0.83     Estimated Creatinine Clearance: 160.1 mL/min (by C-G formula based on SCr of 0.69 mg/dL).  I/O last 3 completed shifts:  In: 1200 [P.O.:1200]  Out: 400 [Urine:400]    Results from last 7 days   Lab Units 11/30/24  0600 11/29/24  0054 11/28/24  1436   WBC 10*3/mm3 8.78 12.09* 14.03*     Temperature    11/29/24 2114 11/29/24 2339 11/30/24 0455   Temp: 97.8 °F (36.6 °C) 97.9 °F (36.6 °C) 97.9 °F (36.6 °C)     Baseline culture/source/susceptibility:  Microbiology Results (last 10 days)       Procedure " Component Value - Date/Time    Blood Culture - Blood, Arm, Right [[id number]]  (Normal) Collected: 11/29/24 0054    Lab Status: Preliminary result Specimen: Blood from Arm, Right Updated: 11/30/24 0115     Blood Culture No growth at 24 hours    Narrative:      Less than seven (7) mL's of blood was collected.  Insufficient quantity may yield false negative results.    Blood Culture - Blood, Arm, Left [[id number]]  (Normal) Collected: 11/29/24 0054    Lab Status: Preliminary result Specimen: Blood from Arm, Left Updated: 11/30/24 0115     Blood Culture No growth at 24 hours    Narrative:      Less than seven (7) mL's of blood was collected.  Insufficient quantity may yield false negative results.    Wound Culture - Wound, Thigh, Right [[id number]] Collected: 11/28/24 2331    Lab Status: Preliminary result Specimen: Wound from Thigh, Right Updated: 11/29/24 0706     Gram Stain Rare (1+) Gram positive cocci in pairs            Natanael Coelho RPH  11/30/24 08:27 EST

## 2024-11-30 NOTE — PLAN OF CARE
Goal Outcome Evaluation:  Patients pain controlled with po pain meds, did request nighttime dose, pain meds adjusted due to not due at time of request to change to every 4 hours from 6, medication effective, dressing to right upper thigh reinforced with tape, packing intact, did get cleaned up/bath tonight, awaiting cultures prior to discharge

## 2024-11-30 NOTE — CASE MANAGEMENT/SOCIAL WORK
Continued Stay Note  [facility]     Patient Name: Alicia Ferrer  MRN: [id number]  Today's Date: 11/30/2024    Admit Date: 11/28/2024    Plan: Home. Family to transport.   Discharge Plan       Row Name 11/30/24 1137       Plan    Plan Home. Family to transport.    Plan Comments Plan to dc today. Transitioned to PO antibiotics.     Darling Perez RN BSN  Weekend   [facility]  Phone: [phone]  Fax: [phone]

## 2024-11-30 NOTE — OUTREACH NOTE
Prep Survey      Flowsheet Row Responses   [facility] discharged from? [facility]   Is LACE score < 7 ? Yes   Eligibility [facility]   Date of Admission 11/28/24   Date of Discharge 11/30/24   Discharge Disposition Home or Self Care   Discharge diagnosis Cellulitis of right thigh-I and D this visit   Does the patient have one of the following disease processes/diagnoses(primary or secondary)? General Surgery   Does the patient have Home health ordered? No   Is there a DME ordered? No   Prep survey completed? Yes            STEPHANIE LEVY - Registered Nurse

## 2024-11-30 NOTE — DISCHARGE SUMMARY
"             [facility] Medicine Services  Discharge Summary    Date of Service: 24  Patient Name: Alicia Ferrer  : 1990  MRN: [id number]    Date of Admission: 2024  Discharge Diagnosis:     Right thigh/grion cellulitis with abscess s/p ID per surgery, cleared per surgery with 1 week of PO Bactrim, f/u outpatient with surgery for cultures and further antibiotics management      Date of Discharge:  24  Primary Care Physician: Alicia Lawton APRN      Presenting Problem:   Cellulitis of right lower extremity [L03.115]  Cellulitis of right thigh [L03.115]    Active and Resolved Hospital Problems:  Active Hospital Problems    Diagnosis POA    **Cellulitis of right thigh [L03.115] Yes      Resolved Hospital Problems   No resolved problems to display.         Hospital Course     HPI:  Per the H&P written by Checo Shah MD , dated 24:  \"Right thigh wound with erythema and pain \"    Hospital Course:  Alicia Ferrer is a 34 y.o. female with a CMH of hypertension who presents to the hospital with complaints of right thigh wound with surrounding erythema and pain.  The patient states she first noticed a pimple like lesion on her right inner thigh several days prior to admission. She attempted to squeeze it to open it up and was able to extract a small amount of purulent material. She went to the pharmacy and obtained some dressings to further assist in draining the lesion, however it continued to get larger and more erythematous surrounding the lesion. She did have some chills and subjective fever 2 days prior to admission. She also has noticed significant erythema surrounding the lesion. The patient is currently not breast-feeding.      Patient was started on IV antibiotics, surgery was consulted, patient underwent incision and drainage, leukocytosis resolved.  Her wound cultures still pending, patient was offered to stay inpatient while the wound cultures are pending, however " patient wants to go home.  It was discussed with the patient by the surgery that the patient can go home.  I have confirmed the plan that was discussed by the surgery team for the patient.    Per surgery team-they will follow-up with wound cultures outpatient and adjust antibiotics as needed per surgery, surgery recommended to send the patient on 1 week of p.o. Bactrim.  The patient is currently not breast-feeding. Per surgery , patient was educated on wound care, she will follow-up outpatient with surgery.    Patient is currently clinically stable. Plan for discharge discussed with the Patient prior to discharge, Patient agreed with the plan. Patient advised to return to hospital or go near by hospital or call 911, should patient's symptoms worsen or recur or worsening of this wound. Patient also advised to follow up with PCP within 1-5 days for recent hospitalization, monitoring and further management of patient's health problems. Patient acknowledged understanding and agreed with the discharge plan.     DISCHARGE Follow Up Recommendations for labs and diagnostics:     -Follow-up with surgery outpatient, they will follow-up on your cultures and adjust antibiotics if needed    -Follow-up with your PCP for recent hospitalization and monitoring of your infection.  You should also have a thorough workup per your PCP for diabetes, defer to your PCP for further workup and management      Reasons For Change In Medications and Indications for New Medications:      Day of Discharge     Vital Signs:  Temp:  [97.8 °F (36.6 °C)-97.9 °F (36.6 °C)] 97.9 °F (36.6 °C)  Heart Rate:  [60-76] 65  Resp:  [16-21] 16  BP: (118-169)/(69-91) 144/91    Physical Exam  General Appearance:  Alert, cooperative, no distress, appears stated age  Head:   Normocephalic, without obvious abnormality, atraumatic  Eyes:   PERRL, conjunctiva/corneas clear, EOM's intact, fundi benign, both eyes  Ears:    Normal TM's and external ear canals, both  ears  Nose:Nares normal, septum midline, mucosa normal, no drainage or sinus tenderness  Throat:Lips, mucosa, and tongue normal; teeth and gums normal  Neck:Supple, symmetrical, trachea midline, no adenopathy, thyroid: not enlarged, symmetric, no tenderness/mass/nodules, no carotid bruit or JVD  Lungs:             Clear to auscultation bilaterally, respirations unlabored  Heart:  Regular rate and rhythm, S1, S2 normal, no murmur, rub or gallop  Abdomen:  Soft, non-tender, bowel sounds active all four quadrants,  no masses, no organomegaly  Extremities:Right inner thigh wound s/p I&D  Pulses:2+ and symmetric  Skin:Skin color, texture, turgor normal, no rashes or lesions  Neurologic: Normal        Pertinent  and/or Most Recent Results     LAB RESULTS:      Lab 11/30/24  0600 11/29/24  0054 11/28/24  1436   WBC 8.78 12.09* 14.03*   HEMOGLOBIN 11.6* 11.0* 11.0*   HEMATOCRIT 37.3 35.0 34.8   PLATELETS 273 260 252   NEUTROS ABS 5.17 7.53* 9.25*   IMMATURE GRANS (ABS) 0.04 0.05 0.03   LYMPHS ABS 2.74 3.27* 3.51*   MONOS ABS 0.59 0.98* 1.03*   EOS ABS 0.20 0.22 0.17   MCV 82.3 82.9 82.3         Lab 11/30/24  0600 11/29/24  0054 11/28/24  1436   SODIUM 137 139 136   POTASSIUM 3.8 3.4* 3.8   CHLORIDE 104 106 101   CO2 24.5 24.5 27.4   ANION GAP 8.5 8.5 7.6   BUN 9 10 11   CREATININE 0.69 0.76 0.83   EGFR 115.5 104.3 93.8   GLUCOSE 109* 107* 90   CALCIUM 8.8 8.6 8.6   MAGNESIUM 2.0 2.3  --    PHOSPHORUS 3.5  --   --    HEMOGLOBIN A1C 5.43  --   --          Lab 11/30/24  0600 11/28/24  1436   TOTAL PROTEIN 6.7 6.3   ALBUMIN 3.8 3.6   GLOBULIN 2.9 2.7   ALT (SGPT) 30 24   AST (SGOT) 19 20   BILIRUBIN 0.3 0.4   ALK PHOS 62 67                     Brief Urine Lab Results       None          Microbiology Results (last 10 days)       Procedure Component Value - Date/Time    Blood Culture - Blood, Arm, Right [[id number]]  (Normal) Collected: 11/29/24 0054    Lab Status: Preliminary result Specimen: Blood from Arm, Right Updated:  11/30/24 0115     Blood Culture No growth at 24 hours    Narrative:      Less than seven (7) mL's of blood was collected.  Insufficient quantity may yield false negative results.    Blood Culture - Blood, Arm, Left [[id number]]  (Normal) Collected: 11/29/24 0054    Lab Status: Preliminary result Specimen: Blood from Arm, Left Updated: 11/30/24 0115     Blood Culture No growth at 24 hours    Narrative:      Less than seven (7) mL's of blood was collected.  Insufficient quantity may yield false negative results.    Wound Culture - Wound, Thigh, Right [[id number]]  (Abnormal) Collected: 11/28/24 2331    Lab Status: Preliminary result Specimen: Wound from Thigh, Right Updated: 11/30/24 1125     Wound Culture Scant growth (1+) Staphylococcus aureus, MRSA     Comment:   Methicillin resistant Staphylococcus aureus, Patient may be an isolation risk.        Gram Stain Rare (1+) Gram positive cocci in pairs            CT Lower Extremity Right With Contrast    Result Date: 11/28/2024  Impression: Impression: 1.Evidence for soft tissue cellulitis involving the medial aspect of the right thigh. There is no evidence for abscess. 2.No evidence for osteomyelitis. Electronically Signed: Andres Del Toro MD  11/28/2024 2:55 PM EST  Workstation ID: [id number]                 Labs Pending at Discharge:  Pending Results       None            Procedures Performed  Procedure(s):  INCISION AND DRAINAGE of Right lower extremeity abcess  11/29 1601 Note By: Nadja Simon PA      Consults:   Consults       Date and Time Order Name Status Description    11/28/2024  5:41 PM Inpatient General Surgery Consult Completed               Discharge Details        Discharge Medications        New Medications        Instructions Start Date   sulfamethoxazole-trimethoprim 800-160 MG per tablet  Commonly known as: Bactrim DS   1 tablet, Oral, 2 Times Daily             Continue These Medications        Instructions Start Date   busPIRone 5 MG  tablet  Commonly known as: BUSPAR   Take 1 tablet by mouth 2 (Two) Times a Day As Needed for anxiety      escitalopram 20 MG tablet  Commonly known as: Lexapro   20 mg, Oral, Daily      lisinopril 10 MG tablet  Commonly known as: PRINIVIL,ZESTRIL   10 mg, Oral, Daily      triamcinolone 0.1 % cream  Commonly known as: KENALOG   Apply pea size amount topically to the affected area(s) three times daily as directed.               No Known Allergies      Discharge Disposition:   Home or Self Care    Diet:  Hospital:  Diet Order   Procedures    Diet: Regular/House; Fluid Consistency: Thin (IDDSI 0)         Discharge Activity:         CODE STATUS:  There are no questions and answers to display.         Future Appointments   Date Time Provider Department Center   4/2/2025  8:00 AM Alicia Lawton APRN [facility]           Time spent on Discharge including face to face service:  >30 minutes    Part of this note may be an electronic transcription/translation of spoken language to printed text using the Dragon Dictation System.    Signature: Electronically signed by Jamey Best MD, 11/30/24, 11:30 EST.  [facility] Hospitalist Team

## 2024-12-01 LAB
BACTERIA SPEC AEROBE CULT: ABNORMAL
GRAM STN SPEC: ABNORMAL

## 2024-12-02 ENCOUNTER — TRANSITIONAL CARE MANAGEMENT TELEPHONE ENCOUNTER (OUTPATIENT)
Dept: CALL CENTER | Facility: HOSPITAL | Age: 34
End: 2024-12-02
Payer: COMMERCIAL

## 2024-12-02 NOTE — OUTREACH NOTE
Call Center TCM Note      Flowsheet Row Responses   [facility] patient discharged from? [facility]   Does the patient have one of the following disease processes/diagnoses(primary or secondary)? General Surgery   TCM attempt successful? Yes   Call start time 1159   Call end time 1203   Discharge diagnosis Cellulitis of right thigh-I and D this visit   Person spoke with today (if not patient) and relationship Patient   Meds reviewed with patient/caregiver? Yes  [New: Bactrim DS]   Does the patient have all medications related to this admission filled (includes all antibiotics, pain medications, etc.) Yes   Is the patient taking all medications as directed (includes completed medication regime)? Yes   Comments PCP Alicia CLARK. Hospital follow up appt scheduled for 12/5  9am with PCP   Does the patient have an appointment with their PCP within 7-14 days of discharge? No   Nursing Interventions Assisted patient with making appointment per protocol, Routed TCM call to PCP office   Has home health visited the patient within 72 hours of discharge? N/A   Psychosocial issues? No   Comments Patient reports doing dressing changes as instructed.   Did the patient receive a copy of their discharge instructions? Yes   Nursing interventions Reviewed instructions with patient   What is the patient's perception of their health status since discharge? Improving  [Reports less redness, small amount of drainage.]   Is the patient /caregiver able to teach back basic post-op care? Keep incision areas clean,dry and protected   Is the patient/caregiver able to teach back signs and symptoms of incisional infection? Increased redness, swelling or pain at the incisonal site, Increased drainage or bleeding, Pus or odor from incision, Fever   Is the patient/caregiver able to teach back steps to recovery at home? Set small, achievable goals for return to baseline health   Is the patient/caregiver able to teach back the hierarchy of  who to call/visit for symptoms/problems? PCP, Specialist, Home health nurse, Urgent Care, ED, 911 Yes   TCM call completed? Yes   Wrap up additional comments Schedule an appointment with  Amadou Mejia as soon as  possible for a visit in 2 weeks   Call end time 1203   Would this patient benefit from a Referral to [facility] Social Work? No   Is the patient interested in additional calls from an ambulatory ? No            Ella Reid RN    12/2/2024, 12:05 EST

## 2024-12-04 LAB
BACTERIA SPEC AEROBE CULT: NORMAL
BACTERIA SPEC AEROBE CULT: NORMAL

## 2024-12-05 ENCOUNTER — OFFICE VISIT (OUTPATIENT)
Dept: FAMILY MEDICINE CLINIC | Facility: CLINIC | Age: 34
End: 2024-12-05
Payer: COMMERCIAL

## 2024-12-05 VITALS
HEART RATE: 81 BPM | BODY MASS INDEX: 49.23 KG/M2 | SYSTOLIC BLOOD PRESSURE: 135 MMHG | WEIGHT: 293 LBS | DIASTOLIC BLOOD PRESSURE: 80 MMHG | TEMPERATURE: 98.6 F | OXYGEN SATURATION: 98 %

## 2024-12-05 DIAGNOSIS — L03.90 WOUND CELLULITIS: Primary | ICD-10-CM

## 2024-12-05 NOTE — PROGRESS NOTES
Transitional Care Follow Up Visit  Subjective     Alicia Ferrer is a 34 y.o. female who presents for a transitional care management visit.    Within 48 business hours after discharge our office contacted her via telephone to coordinate her care and needs.      I reviewed and discussed the details of that call along with the discharge summary, hospital problems, inpatient lab results, inpatient diagnostic studies, and consultation reports with Alicia.     Current outpatient and discharge medications have been reconciled for the patient.  Reviewed by: EDUARDO Sandhu          11/30/2024     4:02 PM   Date of TCM Phone Call   [facility]   Date of Admission 11/28/2024   Date of Discharge 11/30/2024   Discharge Disposition Home or Self Care     Risk for Readmission (LACE) Score: 5 (11/30/2024  6:00 AM)      History of Present Illness  Pt is here today for a [facility] admission follow up.  She was admitted from 11/28-11/30 with right leg cellulitis.  She states that originally started like a pimple that had a small amount of purulent drainage.  The area became larger and more erythematic.  She was having some chills prior to going to the hospital.  Patient had an incision and drainage on the hospital.  Wound cultures were obtained and patient was sent home on Bactrim.  Wound cultures showed staph.  She states it is healing well  Erythema has resolved  Still has a few days left of bactrim  Denies fever or pain.       Hospital Course:  Alicia Ferrer is a 34 y.o. female with a CMH of hypertension who presents to the hospital with complaints of right thigh wound with surrounding erythema and pain.  The patient states she first noticed a pimple like lesion on her right inner thigh several days prior to admission. She attempted to squeeze it to open it up and was able to extract a small amount of purulent material. She went to the pharmacy and obtained some dressings to further assist in draining the lesion, however  it continued to get larger and more erythematous surrounding the lesion. She did have some chills and subjective fever 2 days prior to admission. She also has noticed significant erythema surrounding the lesion. The patient is currently not breast-feeding.      Patient was started on IV antibiotics, surgery was consulted, patient underwent incision and drainage, leukocytosis resolved.  Her wound cultures still pending, patient was offered to stay inpatient while the wound cultures are pending, however patient wants to go home.  It was discussed with the patient by the surgery that the patient can go home.  I have confirmed the plan that was discussed by the surgery team for the patient.     Per surgery team-they will follow-up with wound cultures outpatient and adjust antibiotics as needed per surgery, surgery recommended to send the patient on 1 week of p.o. Bactrim.  The patient is currently not breast-feeding. Per surgery , patient was educated on wound care, she will follow-up outpatient with surgery.     Patient is currently clinically stable. Plan for discharge discussed with the Patient prior to discharge, Patient agreed with the plan. Patient advised to return to hospital or go near by hospital or call 911, should patient's symptoms worsen or recur or worsening of this wound. Patient also advised to follow up with PCP within 1-5 days for recent hospitalization, monitoring and further management of patient's health problems. Patient acknowledged understanding and agreed with the discharge plan.      DISCHARGE Follow Up Recommendations for labs and diagnostics:      -Follow-up with surgery outpatient, they will follow-up on your cultures and adjust antibiotics if needed     -Follow-up with your PCP for recent hospitalization and monitoring of your infection.  You should also have a thorough workup per your PCP for diabetes, defer to your PCP for further workup and management       Course During Hospital Stay:   see HPI     The following portions of the patient's history were reviewed and updated as appropriate: allergies, current medications, past family history, past medical history, past social history, past surgical history, and problem list.    Review of Systems   Constitutional:  Negative for chills, fatigue and fever.   Respiratory:  Negative for chest tightness and shortness of breath.    Cardiovascular:  Negative for chest pain and palpitations.   Skin:  Positive for wound.   Neurological:  Negative for dizziness and headaches.       Objective   /80   Pulse 81   Temp 98.6 °F (37 °C) (Tympanic)   Wt (!) 138 kg (305 lb)   SpO2 98%   BMI 49.23 kg/m²   Physical Exam  Constitutional:       General: She is not in acute distress.     Appearance: Normal appearance. She is not ill-appearing.   HENT:      Head: Normocephalic and atraumatic.   Eyes:      Extraocular Movements: Extraocular movements intact.   Pulmonary:      Effort: Pulmonary effort is normal. No respiratory distress.   Skin:     Comments: Rightinner thigh wound healing well. Flushed with saline and dressing applied. No surrounding erythema. Small amount of induration.    Neurological:      General: No focal deficit present.      Mental Status: She is alert and oriented to person, place, and time.   Psychiatric:         Mood and Affect: Mood normal.         Behavior: Behavior normal.         Thought Content: Thought content normal.         Judgment: Judgment normal.         Assessment & Plan   Problems Addressed this Visit    None  Visit Diagnoses       Wound cellulitis    -  Primary    wound culture shows MRSA  cont bactrim  wound healing well  sees surgeon in a few weeks          Diagnoses         Codes Comments    Wound cellulitis    -  Primary ICD-10-CM: L03.90  ICD-9-CM: 682.9 wound culture shows MRSA  cont bactrim  wound healing well  sees surgeon in a few weeks

## 2024-12-12 ENCOUNTER — OFFICE VISIT (OUTPATIENT)
Dept: SURGERY | Facility: CLINIC | Age: 34
End: 2024-12-12
Payer: COMMERCIAL

## 2024-12-12 VITALS
WEIGHT: 293 LBS | HEART RATE: 81 BPM | OXYGEN SATURATION: 96 % | BODY MASS INDEX: 47.09 KG/M2 | TEMPERATURE: 97.8 F | SYSTOLIC BLOOD PRESSURE: 115 MMHG | DIASTOLIC BLOOD PRESSURE: 65 MMHG | HEIGHT: 66 IN

## 2024-12-12 DIAGNOSIS — L03.115 CELLULITIS OF RIGHT THIGH: Primary | ICD-10-CM

## 2024-12-12 NOTE — PROGRESS NOTES
"Chief Complaint  Post-op (I&D of Right Lower Extremity 11/30/24)    Subjective        Alicia Ferrer presents to [facility]  History of Present Illness    34-year-old lady here for follow-up after her incision and drainage of right thigh abscess.  Doing well no issues or complaints.  Off antibiotics, no further pain.  No evidence of infection.  She can follow-up again future needed.    Objective   Vital Signs:  /65 (Cuff Size: Large Adult)   Pulse 81   Temp 97.8 °F (36.6 °C) (Infrared)   Ht 167.6 cm (66\")   Wt 135 kg (297 lb)   SpO2 96%   BMI 47.94 kg/m²   Estimated body mass index is 47.94 kg/m² as calculated from the following:    Height as of this encounter: 167.6 cm (66\").    Weight as of this encounter: 135 kg (297 lb).            Physical Exam  Constitutional:       General: She is not in acute distress.     Appearance: Normal appearance. She is not ill-appearing.   HENT:      Head: Normocephalic and atraumatic.      Right Ear: External ear normal.      Left Ear: External ear normal.   Eyes:      Extraocular Movements: Extraocular movements intact.      Conjunctiva/sclera: Conjunctivae normal.   Cardiovascular:      Rate and Rhythm: Normal rate and regular rhythm.   Pulmonary:      Effort: Pulmonary effort is normal. No respiratory distress.   Abdominal:      General: There is no distension.      Palpations: Abdomen is soft.      Tenderness: There is no abdominal tenderness.   Musculoskeletal:         General: No swelling or deformity.   Skin:     General: Skin is warm and dry.   Neurological:      Mental Status: She is alert and oriented to person, place, and time. Mental status is at baseline.        Result Review :                Assessment and Plan   Diagnoses and all orders for this visit:    1. Cellulitis of right thigh (Primary)        34-year-old lady here for follow-up after her incision and drainage of right thigh abscess.  Doing well no issues or " complaints.  Off antibiotics, no further pain.  No evidence of infection.  She can follow-up again future needed.         Follow Up   No follow-ups on file.  Patient was given instructions and counseling regarding her condition or for health maintenance advice. Please see specific information pulled into the AVS if appropriate.

## 2024-12-16 RX ORDER — BUSPIRONE HYDROCHLORIDE 5 MG/1
5 TABLET ORAL 2 TIMES DAILY PRN
Qty: 60 TABLET | Refills: 0 | Status: SHIPPED | OUTPATIENT
Start: 2024-12-16

## 2025-02-12 RX ORDER — BUSPIRONE HYDROCHLORIDE 5 MG/1
5 TABLET ORAL 2 TIMES DAILY PRN
Qty: 60 TABLET | Refills: 0 | Status: SHIPPED | OUTPATIENT
Start: 2025-02-12

## 2025-04-02 ENCOUNTER — OFFICE VISIT (OUTPATIENT)
Dept: FAMILY MEDICINE CLINIC | Facility: CLINIC | Age: 35
End: 2025-04-02
Payer: COMMERCIAL

## 2025-04-02 VITALS
HEART RATE: 71 BPM | HEIGHT: 66 IN | WEIGHT: 285.4 LBS | DIASTOLIC BLOOD PRESSURE: 79 MMHG | BODY MASS INDEX: 45.87 KG/M2 | OXYGEN SATURATION: 97 % | TEMPERATURE: 98.6 F | SYSTOLIC BLOOD PRESSURE: 123 MMHG

## 2025-04-02 DIAGNOSIS — L40.9 PSORIASIS: ICD-10-CM

## 2025-04-02 DIAGNOSIS — I10 HYPERTENSION, UNSPECIFIED TYPE: ICD-10-CM

## 2025-04-02 DIAGNOSIS — F41.9 ANXIETY: Primary | ICD-10-CM

## 2025-04-02 PROCEDURE — 99214 OFFICE O/P EST MOD 30 MIN: CPT | Performed by: NURSE PRACTITIONER

## 2025-04-02 NOTE — PROGRESS NOTES
Subjective     Alicia Ferrer is a 34 y.o. female.     History of Present Illness  Patient is here today for 6-month follow-up on chronic medical conditions.  Pt is a  at the [employer].  She is  with 2 children-  son and daughter  She has not been exercising but is trying to stay active  She is eating healthier.  She is down 20lbs since Dec.   She had a tubule ligation.     Hypertension-patient is currently on lisinopril 10mg.  Denies CP, SOA, dizziness, HA. She is doing well.      Anxiety-patient is currently on Lexapro 20 mg daily and buspar 5mg daily prn. She does take it everyday in the morning. She will sometimes take another dose. Doing well on medication. Denies any SI or HI. No side effects.     Psoriasis-patient currently has Kenalog 0.1% cream as needed.  She does not get flares often.  When she does they are mostly on her scalp. She will get one on her elbow as well.      Labs- UTD  Pap smear- UTD 2/9/22     Vaccines:  Flu- N/A  PNA- N/A  Tdap- 2019  Covid-19- UTD     Dental exam- UTD  Eye exam- UTD             Hypertension  Pertinent negatives include no chest pain, neck pain or shortness of breath.        The following portions of the patient's history were reviewed and updated as appropriate: allergies, current medications, past family history, past medical history, past social history, past surgical history, and problem list.    Review of Systems   Constitutional:  Negative for chills, diaphoresis, fatigue and fever.   HENT:  Negative for congestion, sore throat and swollen glands.    Respiratory:  Negative for cough, chest tightness and shortness of breath.    Cardiovascular:  Negative for chest pain.   Gastrointestinal:  Negative for abdominal pain, nausea and vomiting.   Genitourinary:  Negative for dysuria.   Musculoskeletal:  Negative for myalgias and neck pain.   Skin:  Negative for rash.   Neurological:  Negative for dizziness, weakness, numbness and headache.  "      Objective     /79 (BP Location: Left arm, Patient Position: Sitting, Cuff Size: Large Adult)   Pulse 71   Temp 98.6 °F (37 °C) (Temporal)   Ht 167.6 cm (65.98\")   Wt 129 kg (285 lb 6.4 oz)   LMP 02/14/2025 (Approximate)   SpO2 97%   BMI 46.09 kg/m²     Current Outpatient Medications on File Prior to Visit   Medication Sig Dispense Refill    busPIRone (BUSPAR) 5 MG tablet Take 1 tablet by mouth 2 (Two) Times a Day As Needed for anxiety 60 tablet 0    escitalopram (Lexapro) 20 MG tablet Take 1 tablet by mouth Daily. 90 tablet 1    lisinopril (PRINIVIL,ZESTRIL) 10 MG tablet Take 1 tablet by mouth Daily. 90 tablet 1    triamcinolone (KENALOG) 0.1 % cream Apply pea size amount topically to the affected area(s) three times daily as directed. 80 g 3     No current facility-administered medications on file prior to visit.                 Physical Exam  Constitutional:       General: She is not in acute distress.     Appearance: Normal appearance. She is not ill-appearing.   HENT:      Head: Normocephalic and atraumatic.   Eyes:      Extraocular Movements: Extraocular movements intact.   Cardiovascular:      Rate and Rhythm: Normal rate and regular rhythm.      Heart sounds: No murmur heard.  Pulmonary:      Effort: Pulmonary effort is normal. No respiratory distress.   Neurological:      General: No focal deficit present.      Mental Status: She is alert and oriented to person, place, and time.   Psychiatric:         Mood and Affect: Mood normal.         Behavior: Behavior normal.         Thought Content: Thought content normal.         Judgment: Judgment normal.           Assessment & Plan     Diagnoses and all orders for this visit:    1. Anxiety (Primary)  Comments:  stable  cont lexapro and buspar  denies SI or HI    2. Hypertension, unspecified type  Comments:  stable  cont lisinopril    3. Psoriasis  Comments:  stable  steroid cream prn              " clear to auscultation bilaterally/no wheezes/no respiratory distress

## 2025-04-10 DIAGNOSIS — F41.9 ANXIETY: ICD-10-CM

## 2025-04-10 DIAGNOSIS — I10 HYPERTENSION, UNSPECIFIED TYPE: ICD-10-CM

## 2025-04-10 RX ORDER — ESCITALOPRAM OXALATE 20 MG/1
20 TABLET ORAL DAILY
Qty: 90 TABLET | Refills: 1 | Status: SHIPPED | OUTPATIENT
Start: 2025-04-10

## 2025-04-10 RX ORDER — LISINOPRIL 10 MG/1
10 TABLET ORAL DAILY
Qty: 90 TABLET | Refills: 1 | Status: SHIPPED | OUTPATIENT
Start: 2025-04-10

## 2025-04-10 RX ORDER — BUSPIRONE HYDROCHLORIDE 5 MG/1
5 TABLET ORAL 2 TIMES DAILY PRN
Qty: 60 TABLET | Refills: 0 | Status: SHIPPED | OUTPATIENT
Start: 2025-04-10

## 2025-06-24 RX ORDER — BUSPIRONE HYDROCHLORIDE 5 MG/1
5 TABLET ORAL 2 TIMES DAILY PRN
Qty: 60 TABLET | Refills: 0 | Status: SHIPPED | OUTPATIENT
Start: 2025-06-24

## 2025-08-21 RX ORDER — BUSPIRONE HYDROCHLORIDE 5 MG/1
5 TABLET ORAL 2 TIMES DAILY PRN
Qty: 60 TABLET | Refills: 0 | Status: SHIPPED | OUTPATIENT
Start: 2025-08-21

## (undated) DEVICE — PK C SECT 50

## (undated) DEVICE — GLV SURG SENSICARE PI MIC PF SZ7 LF STRL

## (undated) DEVICE — SUT VIC 0 CT1 36IN J946H

## (undated) DEVICE — SUT MNCRYL 0 CT 36IN

## (undated) DEVICE — SPNG LAP PREWSH SFTPK 18X18IN STRL PK/5

## (undated) DEVICE — SOL IRRIG H2O 1000ML STRL

## (undated) DEVICE — SUT GUT PLN 0 STD TIE 54IN S104H

## (undated) DEVICE — SOL IRRIG NACL 9PCT 1000ML BTL

## (undated) DEVICE — SUT MNCRYL 3/0 CT1 36 IN Y944H

## (undated) DEVICE — SUT MNCRYL 0/0 CTX 36IN Y398H

## (undated) DEVICE — TRY SADDLE BLCK 25G

## (undated) DEVICE — DRSNG WND BORDR/ADHS NONADHR/GZ LF 4X10IN STRL